# Patient Record
Sex: MALE | Race: WHITE | Employment: OTHER | URBAN - METROPOLITAN AREA
[De-identification: names, ages, dates, MRNs, and addresses within clinical notes are randomized per-mention and may not be internally consistent; named-entity substitution may affect disease eponyms.]

---

## 2017-03-02 RX ORDER — RIVAROXABAN 20 MG/1
TABLET, FILM COATED ORAL
Qty: 90 TABLET | Refills: 3 | Status: SHIPPED | OUTPATIENT
Start: 2017-03-02 | End: 2018-01-05 | Stop reason: SDUPTHER

## 2017-06-27 ENCOUNTER — OFFICE VISIT (OUTPATIENT)
Dept: FAMILY MEDICINE CLINIC | Age: 72
End: 2017-06-27
Payer: MEDICARE

## 2017-06-27 VITALS
DIASTOLIC BLOOD PRESSURE: 68 MMHG | SYSTOLIC BLOOD PRESSURE: 120 MMHG | HEIGHT: 64 IN | OXYGEN SATURATION: 98 % | HEART RATE: 75 BPM | BODY MASS INDEX: 32.2 KG/M2 | WEIGHT: 188.6 LBS

## 2017-06-27 DIAGNOSIS — J32.9 SINUSITIS, UNSPECIFIED CHRONICITY, UNSPECIFIED LOCATION: Primary | ICD-10-CM

## 2017-06-27 PROCEDURE — 99213 OFFICE O/P EST LOW 20 MIN: CPT | Performed by: FAMILY MEDICINE

## 2017-06-27 RX ORDER — AMOXICILLIN 875 MG/1
875 TABLET, COATED ORAL 2 TIMES DAILY
Qty: 20 TABLET | Refills: 0 | Status: SHIPPED | OUTPATIENT
Start: 2017-06-27 | End: 2017-07-07

## 2017-10-10 ENCOUNTER — NURSE ONLY (OUTPATIENT)
Dept: FAMILY MEDICINE CLINIC | Age: 72
End: 2017-10-10
Payer: MEDICARE

## 2017-10-10 DIAGNOSIS — Z23 IMMUNIZATION DUE: Primary | ICD-10-CM

## 2017-10-10 PROCEDURE — G0008 ADMIN INFLUENZA VIRUS VAC: HCPCS | Performed by: FAMILY MEDICINE

## 2017-10-10 PROCEDURE — 90688 IIV4 VACCINE SPLT 0.5 ML IM: CPT | Performed by: FAMILY MEDICINE

## 2017-11-21 ENCOUNTER — OFFICE VISIT (OUTPATIENT)
Dept: FAMILY MEDICINE CLINIC | Age: 72
End: 2017-11-21
Payer: MEDICARE

## 2017-11-21 VITALS
BODY MASS INDEX: 32.17 KG/M2 | SYSTOLIC BLOOD PRESSURE: 126 MMHG | WEIGHT: 188.4 LBS | HEIGHT: 64 IN | DIASTOLIC BLOOD PRESSURE: 78 MMHG

## 2017-11-21 DIAGNOSIS — T14.8XXA SKIN ABRASION: ICD-10-CM

## 2017-11-21 DIAGNOSIS — Z12.11 SCREENING FOR COLON CANCER: Primary | ICD-10-CM

## 2017-11-21 PROCEDURE — 1123F ACP DISCUSS/DSCN MKR DOCD: CPT | Performed by: NURSE PRACTITIONER

## 2017-11-21 PROCEDURE — 4040F PNEUMOC VAC/ADMIN/RCVD: CPT | Performed by: NURSE PRACTITIONER

## 2017-11-21 PROCEDURE — G8417 CALC BMI ABV UP PARAM F/U: HCPCS | Performed by: NURSE PRACTITIONER

## 2017-11-21 PROCEDURE — G8484 FLU IMMUNIZE NO ADMIN: HCPCS | Performed by: NURSE PRACTITIONER

## 2017-11-21 PROCEDURE — 3017F COLORECTAL CA SCREEN DOC REV: CPT | Performed by: NURSE PRACTITIONER

## 2017-11-21 PROCEDURE — 1036F TOBACCO NON-USER: CPT | Performed by: NURSE PRACTITIONER

## 2017-11-21 PROCEDURE — G8427 DOCREV CUR MEDS BY ELIG CLIN: HCPCS | Performed by: NURSE PRACTITIONER

## 2017-11-21 PROCEDURE — 99213 OFFICE O/P EST LOW 20 MIN: CPT | Performed by: NURSE PRACTITIONER

## 2017-11-21 RX ORDER — B-COMPLEX WITH VITAMIN C
TABLET ORAL DAILY
COMMUNITY
End: 2018-07-10 | Stop reason: ALTCHOICE

## 2017-11-21 RX ORDER — ACETAMINOPHEN 500 MG
1000 TABLET ORAL DAILY
Status: ON HOLD | COMMUNITY
End: 2018-07-24 | Stop reason: HOSPADM

## 2017-11-21 ASSESSMENT — ENCOUNTER SYMPTOMS
CONSTIPATION: 0
VOMITING: 0
EYE ITCHING: 0
STRIDOR: 0
EYE PAIN: 0
APNEA: 0
GASTROINTESTINAL NEGATIVE: 1
ABDOMINAL PAIN: 0
CHOKING: 0
EYE DISCHARGE: 0
COLOR CHANGE: 0
SHORTNESS OF BREATH: 0
PHOTOPHOBIA: 0
RESPIRATORY NEGATIVE: 1
ALLERGIC/IMMUNOLOGIC NEGATIVE: 1
NAUSEA: 0
WHEEZING: 0
VOICE CHANGE: 0
CHEST TIGHTNESS: 0
COUGH: 0
SORE THROAT: 0
EYES NEGATIVE: 1
BACK PAIN: 0
EYE REDNESS: 0
DIARRHEA: 0

## 2017-11-21 NOTE — PROGRESS NOTES
Sig Dispense Refill    XARELTO 20 MG TABS tablet TAKE 1 TABLET EVERY DAY 90 tablet 3     No current facility-administered medications for this visit. No Known Allergies    HPI:     HPI     Patient got out of the shower yesterday, patient isn't sure what happened but back was bleeding. Patient notes no injury or scratch to the back. Bleeding started when patient was using towel on his back. Bled for a short period of time and then stopped. Patient concerned because a friend passed away from skin cancer, and had a lesion that started bleeding as well. Patient is also on Xarelto for a history of Pe's. States no easy bruising or bleeding. Did not lose any significant amount of blood from this incident. Subjective:      Review of Systems   Constitutional: Negative. Negative for activity change, appetite change, chills, diaphoresis, fatigue, fever and unexpected weight change. HENT: Negative. Negative for congestion, postnasal drip, sneezing, sore throat and voice change. Eyes: Negative. Negative for photophobia, pain, discharge, redness, itching and visual disturbance. Respiratory: Negative. Negative for apnea, cough, choking, chest tightness, shortness of breath, wheezing and stridor. Cardiovascular: Negative. Negative for chest pain, palpitations and leg swelling. Gastrointestinal: Negative. Negative for abdominal pain, constipation, diarrhea, nausea and vomiting. Endocrine: Negative. Negative for cold intolerance, heat intolerance, polydipsia, polyphagia and polyuria. Genitourinary: Negative. Negative for difficulty urinating and dysuria. Musculoskeletal: Negative. Negative for arthralgias, back pain, gait problem, joint swelling, myalgias, neck pain and neck stiffness. Skin: Negative. Negative for color change, pallor, rash and wound. Abrasion w/ small scab to patients left-mid back   Allergic/Immunologic: Negative.   Negative for environmental allergies, food allergies and immunocompromised state. Neurological: Negative. Negative for dizziness, tremors, seizures, syncope, facial asymmetry, speech difficulty, weakness, light-headedness, numbness and headaches. Hematological: Negative. Negative for adenopathy. Does not bruise/bleed easily (on xarelto). Psychiatric/Behavioral: Negative. Objective:     Vitals:    11/21/17 0853   BP: 126/78       Body mass index is 32.34 kg/m². Physical Exam   Constitutional: He is oriented to person, place, and time. He appears well-developed and well-nourished. No distress. HENT:   Head: Normocephalic and atraumatic. Right Ear: External ear normal.   Left Ear: External ear normal.   Eyes: Conjunctivae are normal. Pupils are equal, round, and reactive to light. Neck: Normal range of motion. Neck supple. No JVD present. Cardiovascular: Normal rate, regular rhythm, normal heart sounds and intact distal pulses. Exam reveals no gallop and no friction rub. No murmur heard. Pulmonary/Chest: Effort normal and breath sounds normal. No respiratory distress. He has no wheezes. He has no rales. Abdominal: Soft. Bowel sounds are normal. He exhibits no distension. Musculoskeletal: Normal range of motion. He exhibits no edema, tenderness or deformity. Neurological: He is alert and oriented to person, place, and time. GCS eye subscore is 4. GCS verbal subscore is 5. GCS motor subscore is 6. Skin: Skin is warm and dry. Abrasion noted. No rash noted. No erythema. No pallor. Psychiatric: He has a normal mood and affect. His behavior is normal. Judgment and thought content normal.         Assessment:         1. Screening for colon cancer    2. Skin abrasion        Plan:     1. Screening for colon cancer    - COLONOSCOPY (Screening)  - [address] [facility] [address], DO*    5 year screening - given order for same physician patient saw five years ago.      2. Skin abrasion    Monitor abrasion/scab to ensure that it heals completely. At this time I do not seen anything concerning. Show area to Dr. Radha Geller on follow-up visit to make sure there are no concerns after healing. Discussed monitoring of skin including watching size, color, and borders of moles and bring any concerns you have to the office. Keep skin well moisturized. The ABCDEs of Melanoma  These characteristics are used by dermatologists to classify melanomas. Look for these signs: Asymmetry, irregular Borders, more than one or uneven distribution of Color, or a large (greater than 6mm) Diameter. Finally, pay attention to the Evolution of your moles - know what's normal for your skin and check it regularly for changes. If you see one or more of these, make an appointment with a dermatologist immediately. Need more information? Be sure also to check out our pictures of melanoma. Please note that not all melanomas fall within the ABCDE parameters so visit your dermatologist regularly to catch any potiential issues early. A - Asymmetrical Shape  Melanoma lesions are often irregular, or not symmetrical, in shape. Benign moles are usually symmetrical.    B - Border  Typically, non-cancerous moles have smooth, even borders. Melanoma lesions usually have irregular borders that are difficult to define. C - Color  The presence of more than one color (blue, black, brown, tan, etc.) or the uneven distribution of color can sometimes be a warning sign of melanoma. Benign moles are usually a single shade of brown or tan. D - Diameter  Melanoma lesions are often greater than 6 millimeters in diameter (approximately the size of a pencil eraser). E - Evolution  The evolution of your mole(s) has become the most important factor to consider when it comes to diagnosing a melanoma. Knowing what is normal for YOU could save your life.  If a mole has gone through recent changes in color and/or size, bring it to the

## 2017-12-13 ENCOUNTER — OFFICE VISIT (OUTPATIENT)
Dept: FAMILY MEDICINE CLINIC | Age: 72
End: 2017-12-13
Payer: MEDICARE

## 2017-12-13 VITALS
SYSTOLIC BLOOD PRESSURE: 110 MMHG | BODY MASS INDEX: 32.61 KG/M2 | HEIGHT: 64 IN | HEART RATE: 70 BPM | DIASTOLIC BLOOD PRESSURE: 72 MMHG | WEIGHT: 191 LBS

## 2017-12-13 DIAGNOSIS — Z00.00 ROUTINE GENERAL MEDICAL EXAMINATION AT A HEALTH CARE FACILITY: ICD-10-CM

## 2017-12-13 DIAGNOSIS — D12.6 ADENOMATOUS POLYP OF COLON, UNSPECIFIED PART OF COLON: Primary | ICD-10-CM

## 2017-12-13 PROCEDURE — G0439 PPPS, SUBSEQ VISIT: HCPCS | Performed by: FAMILY MEDICINE

## 2017-12-13 ASSESSMENT — PATIENT HEALTH QUESTIONNAIRE - PHQ9: SUM OF ALL RESPONSES TO PHQ QUESTIONS 1-9: 0

## 2017-12-13 NOTE — PATIENT INSTRUCTIONS
Types of advance directives     A health care proxy (durable power of ) is a document that names someone you trust to make health decisions if you cant. A living will tells which treatment you want if your life is threatened, including:    Dialysis and breathing machines  Resuscitation if you stop breathing or if your heart stops  Tube feeding  Organ or tissue donation after you die      The following web sites has both documents included along with a detailed explanation. For the state of [place]    http://[facility]. org/[facility]/media/Images/Membership%20Services/Documents/advance-directives-2015-update-final5. pdf    For the [facility]    http://www.tim.org/. pdf        Personalized Preventive Plan for Ken Mancilla - 12/13/2017  Medicare offers a range of preventive health benefits. Some of the tests and screenings are paid in full while other may be subject to a deductible, co-insurance, and/or copay. Some of these benefits include a comprehensive review of your medical history including lifestyle, illnesses that may run in your family, and various assessments and screenings as appropriate. After reviewing your medical record and screening and assessments performed today your provider may have ordered immunizations, labs, imaging, and/or referrals for you. A list of these orders (if applicable) as well as your Preventive Care list are included within your After Visit Summary for your review. Other Preventive Recommendations:    · A preventive eye exam performed by an eye specialist is recommended every 1-2 years to screen for glaucoma; cataracts, macular degeneration, and other eye disorders. · A preventive dental visit is recommended every 6 months. · Try to get at least 150 minutes of exercise per week or 10,000 steps per day on a pedometer .   · Order or download the FREE \"Exercise & Physical Activity: Your Everyday Guide\"

## 2017-12-13 NOTE — PROGRESS NOTES
Medicare Annual Wellness Visit  Name: [facility] Date: 2017   MRN: [id number] Sex: Male   Age: 81 y.o. Ethnicity: Non-/Non    : 1945 Race: Allison Sibley is here for Medicare AWV    Screenings for behavioral, psychosocial and functional/safety risks, and cognitive dysfunction are all negative except as indicated below. These results, as well as other patient data from the [address] form, are documented in Flowsheets linked to this Encounter. No Known Allergies  Prior to Visit Medications    Medication Sig Taking? Authorizing Provider   acetaminophen (TYLENOL) 500 MG tablet Take 1,000 mg by mouth daily Yes Historical Provider, MD   Zinc 100 MG TABS Take by mouth daily Yes Historical Provider, MD   XARELTO 20 MG TABS tablet TAKE 1 TABLET EVERY DAY Yes Marj Ferrer MD     History reviewed. No pertinent past medical history. History reviewed. No pertinent surgical history. History reviewed. No pertinent family history. CareTeam (Including outside providers/suppliers regularly involved in providing care):   Patient Care Team:  Marj Ferrer MD as PCP - General (Family Medicine)  Bella Keyes MD as Consulting Physician (Urology)  Leni Golden MD as Consulting Physician  Torsten Dominguez MD as Consulting Physician (Pulmonology)    Wt Readings from Last 3 Encounters:   17 191 lb (86.6 kg)   17 188 lb 6.4 oz (85.5 kg)   17 188 lb 9.6 oz (85.5 kg)     Vitals:    17 0854   BP: 110/72   Site: Right Arm   Position: Sitting   Cuff Size: Medium Adult   Pulse: 70   Weight: 191 lb (86.6 kg)   Height: 5' 4\" (1.626 m)       Subjective: Duane Hilliard presents for   Chief Complaint   Patient presents with   [facility]         Patient Active Problem List   Diagnosis    Pulmonary embolism without acute cor pulmonale ([facility] [address]    History of shingles       Review of Systems:  · General: no significant weight changes.     · Respiratory: no Fu colonoscopy     Order Specific Question:   Screening or Diagnostic? Answer:   Screening     Refer to surgery      Patient's complete Health Risk Assessment and screening values have been reviewed and are found in Flowsheets. The following problems were reviewed today and where indicated follow up appointments were made and/or referrals ordered. Positive Risk Factor Screenings with Interventions:     General Health:     General Health Risk Interventions:  · None indicated    Health Habits/Nutrition:     Body mass index is 32.79 kg/m². Health Habits/Nutrition Interventions:  · None indicated      Personalized Preventive Plan   Current Health Maintenance Status  Immunization History   Administered Date(s) Administered    Influenza Virus Vaccine 11/04/2013, 10/17/2014, 11/12/2015    Influenza, Quadv, 3 Years and older, IM 11/01/2016, 10/10/2017    Pneumococcal 13-valent Conjugate ([id number]) 12/12/2016    Pneumococcal Polysaccharide ([id number]) 11/04/2013        Health Maintenance   Topic Date Due    DTaP/Tdap/Td vaccine (1 - Tdap) 09/06/1966    Colon cancer screen colonoscopy  08/06/2017    Zostavax vaccine  01/01/2023 (Originally 9/6/2007)    Lipid screen  12/20/2021    Flu vaccine  Completed    Pneumococcal low/med risk  Completed    Hepatitis C screen  Completed     Recommendations for Preventive Services Due: see orders.   Recommended screening schedule for the next 5-10 years is provided to the patient in written form: see Patient Instructions/AVS.

## 2018-01-16 ENCOUNTER — HOSPITAL ENCOUNTER (OUTPATIENT)
Age: 73
Setting detail: SPECIMEN
Discharge: HOME | End: 2018-01-16
Payer: MEDICARE

## 2018-01-19 LAB — SURGICAL PATHOLOGY REPORT: NORMAL

## 2018-05-31 ENCOUNTER — OFFICE VISIT (OUTPATIENT)
Dept: FAMILY MEDICINE CLINIC | Age: 73
End: 2018-05-31
Payer: MEDICARE

## 2018-05-31 VITALS
DIASTOLIC BLOOD PRESSURE: 80 MMHG | WEIGHT: 187.5 LBS | OXYGEN SATURATION: 96 % | SYSTOLIC BLOOD PRESSURE: 114 MMHG | BODY MASS INDEX: 32.18 KG/M2 | HEART RATE: 72 BPM

## 2018-05-31 DIAGNOSIS — L03.011 CELLULITIS OF FINGER OF RIGHT HAND: Primary | ICD-10-CM

## 2018-05-31 DIAGNOSIS — K40.91 UNILATERAL RECURRENT INGUINAL HERNIA WITHOUT OBSTRUCTION OR GANGRENE: ICD-10-CM

## 2018-05-31 PROCEDURE — 4040F PNEUMOC VAC/ADMIN/RCVD: CPT | Performed by: FAMILY MEDICINE

## 2018-05-31 PROCEDURE — G8427 DOCREV CUR MEDS BY ELIG CLIN: HCPCS | Performed by: FAMILY MEDICINE

## 2018-05-31 PROCEDURE — 99214 OFFICE O/P EST MOD 30 MIN: CPT | Performed by: FAMILY MEDICINE

## 2018-05-31 PROCEDURE — 1123F ACP DISCUSS/DSCN MKR DOCD: CPT | Performed by: FAMILY MEDICINE

## 2018-05-31 PROCEDURE — 1036F TOBACCO NON-USER: CPT | Performed by: FAMILY MEDICINE

## 2018-05-31 PROCEDURE — G8417 CALC BMI ABV UP PARAM F/U: HCPCS | Performed by: FAMILY MEDICINE

## 2018-05-31 PROCEDURE — 3017F COLORECTAL CA SCREEN DOC REV: CPT | Performed by: FAMILY MEDICINE

## 2018-05-31 RX ORDER — CEPHALEXIN 500 MG/1
500 CAPSULE ORAL 3 TIMES DAILY
Qty: 30 CAPSULE | Refills: 0 | Status: SHIPPED | OUTPATIENT
Start: 2018-05-31 | End: 2018-06-10

## 2018-07-10 ENCOUNTER — HOSPITAL ENCOUNTER (OUTPATIENT)
Dept: PREADMISSION TESTING | Age: 73
Discharge: HOME | End: 2018-07-14
Payer: MEDICARE

## 2018-07-10 LAB
ABSOLUTE EOS #: 0.4 K/UL (ref 0–0.4)
ABSOLUTE IMMATURE GRANULOCYTE: ABNORMAL K/UL (ref 0–0.3)
ABSOLUTE LYMPH #: 2.6 K/UL (ref 1–4.8)
ABSOLUTE MONO #: 0.9 K/UL (ref 0.2–0.8)
BASOPHILS # BLD: 1 % (ref 0–2)
BASOPHILS ABSOLUTE: 0.1 K/UL (ref 0–0.2)
DIFFERENTIAL TYPE: ABNORMAL
EKG ATRIAL RATE: 61 BPM
EKG P AXIS: 14 DEGREES
EKG P-R INTERVAL: 174 MS
EKG Q-T INTERVAL: 400 MS
EKG QRS DURATION: 100 MS
EKG QTC CALCULATION (BAZETT): 402 MS
EKG R AXIS: -25 DEGREES
EKG T AXIS: 15 DEGREES
EKG VENTRICULAR RATE: 61 BPM
EOSINOPHILS RELATIVE PERCENT: 5 % (ref 1–4)
HCT VFR BLD CALC: 50 % (ref 41–53)
HEMOGLOBIN: 15.9 G/DL (ref 13.5–17.5)
IMMATURE GRANULOCYTES: ABNORMAL %
LYMPHOCYTES # BLD: 31 % (ref 24–44)
MCH RBC QN AUTO: 28.1 PG (ref 26–34)
MCHC RBC AUTO-ENTMCNC: 31.7 G/DL (ref 31–37)
MCV RBC AUTO: 88.6 FL (ref 80–100)
MONOCYTES # BLD: 11 % (ref 1–7)
NRBC AUTOMATED: ABNORMAL PER 100 WBC
PDW BLD-RTO: 14.6 % (ref 11.5–14.5)
PLATELET # BLD: 216 K/UL (ref 130–400)
PLATELET ESTIMATE: ABNORMAL
PMV BLD AUTO: 9.8 FL (ref 6–12)
RBC # BLD: 5.65 M/UL (ref 4.5–5.9)
RBC # BLD: ABNORMAL 10*6/UL
SEG NEUTROPHILS: 52 % (ref 36–66)
SEGMENTED NEUTROPHILS ABSOLUTE COUNT: 4.3 K/UL (ref 1.8–7.7)
WBC # BLD: 8.3 K/UL (ref 3.5–11)
WBC # BLD: ABNORMAL 10*3/UL

## 2018-07-10 PROCEDURE — 36415 COLL VENOUS BLD VENIPUNCTURE: CPT

## 2018-07-10 PROCEDURE — 93005 ELECTROCARDIOGRAM TRACING: CPT

## 2018-07-10 PROCEDURE — 85025 COMPLETE CBC W/AUTO DIFF WBC: CPT

## 2018-07-10 NOTE — PRE-PROCEDURE INSTRUCTIONS
[facility] Tuesday[facility] at 6:00 AM    Once you enter the hospital lobby, take the elevators to the second floor. Check-In is at the surgery registration desk. If you have been given a blood band, you must bring it with you the day of surgery. Continue to take your home medications as you normally do up to and including the night before surgery with the exception of any blood thinning medications. Please stop any blood thinning medications as directed by your surgeon or prescribing physician. Failure to stop certain medications may interfere with your scheduled surgery. These may include:  Aspirin, Warfarin (Coumadin), Clopidogrel (Plavix), Ibuprofen (Motrin, Advil), Naproxen (Aleve), Meloxicam (Mobic), Celecoxib (Celebrex), Eliquis, Pradaxa, Xarelto, Effient, Fish Oil, Herbal supplements. If you are diabetic, do not take any of your diabetic medications by mouth the morning of surgery. If you are taking insulin contact the doctor that manages your diabetes for instructions about any changes to your insulin dosages the day before surgery. Do not inject insulin or other injectable diabetic medications the morning of surgery unless otherwise instructed by the doctor who manages your diabetes. Please take the following medication(s) the day of surgery with a small sip of water:  None. Please use your inhalers at home the day of surgery. PREPARING FOR YOUR SURGERY:     Before surgery, you can play an important role in your own health. Because skin is not sterile, we need to be sure that your skin is as free of germs as possible before surgery by carefully washing before surgery. Preparing or prepping skin before surgery can reduce the risk of a surgical site infection.   Do not shave the area of your body where your surgery will be performed unless you received specific permission from your physician.     You will need to shower at home the night before surgery and the morning of surgery with a special soap called chlorhexidine gluconate (CHG*). *Not to be used by people allergic to Chlorhexidine Gluconate (CHG). Following these instructions will help you be sure that your skin is clean before surgery. Instructions on cleaning your skin before surgery: The night before your surgery:      You will need to shower with warm water (not hot) and the CHG soap.  Use a clean wash cloth and a clean towel. Have clean clothes available to put on after the shower.    First wash your hair with regular shampoo. Rinse your hair and body thoroughly to remove the shampoo. Cielo Martines Wash your face with your regular soap or water only. Thoroughly rinse your body with warm water from the neck down.  Turn water off to prevent rinsing the soap off too soon.  With a clean wet washcloth and half of the CHG soap in the bottle, lather your entire body from the neck down. Do not use CHG soap near your eyes or ears to avoid injury to those areas.  Wash thoroughly, paying special attention to the area where your surgery will be performed.  Wash your body gently for five (5) minutes. Avoid scrubbing your skin too hard.  Turn the water back on and rinse your body thoroughly.  Pat yourself dry with a clean, soft towel. Do not apply lotion, cream or powder.  Dress with clean freshly washed clothes. The morning of surgery:     Repeat shower following steps above - using remaining half of CHG soap in bottle. Patient Instructions:    Cielo Martines If you are having any type of anesthesia you are to have nothing to eat or drink after midnight the night before your surgery. This includes gum, mints, water or smoking or chewing tobacco.  The only exception to this is a small sip of water to take with any morning dose of heart, blood pressure, or seizure medications. No alcoholic beverages for 24 hours prior to surgery.      Bring a list of all medications you take, along with the dose of the medications and how often you take it. If more convenient bring the pharmacy bottles in a zip lock bag.  Brush your teeth but do not swallow water.  Bring your eyeglasses and case with you. No contacts are to be worn the day of surgery. You also may bring your hearing aids.  If you are on C-PAP or Bi-PAP at home and plan on staying in the hospital overnight for your surgery please bring the machine with you. · Do not wear any jewelry or body piercings day of surgery. Also, NO lotion, perfume or deodorant to be used the day of surgery. No nail polish on the operative extremity (arm/leg surgeries)    · Do not bring any valuables such as jewelry, cash, or credit cards. If you are staying overnight with us, please bring a small bag of personal items.  Please wear loose, comfortable clothing. If you are potentially going to have a cast or brace bring clothing that will fit over them.  In case of illness - If you have cold or flu like symptoms (high fever, runny nose, sore throat, cough, etc.) rash, nausea, vomiting, loose stools, and/or recent contact with someone who has a contagious disease (chicken pox, measles, etc.) Please call your doctor before coming to the hospital.     If your child is having surgery please make arrangements for any other children to be cared for at home on the day of surgery. Other children are not permitted in recovery room and we want you to be able to spend time with the patient. If other arrangements are not available then we suggest that you have a second adult to stay in the waiting room. Day of Surgery/Procedure:    As a patient at [place] [address] you can expect quality medical and nursing care that is centered on your individual needs.   Our goal is to make your surgical experience as comfortable as

## 2018-07-23 ENCOUNTER — ANESTHESIA EVENT (OUTPATIENT)
Dept: OPERATING ROOM | Age: 73
End: 2018-07-23
Payer: MEDICARE

## 2018-07-24 ENCOUNTER — HOSPITAL ENCOUNTER (OUTPATIENT)
Age: 73
Setting detail: OUTPATIENT SURGERY
Discharge: HOME | End: 2018-07-24
Attending: SURGERY | Admitting: SURGERY
Payer: MEDICARE

## 2018-07-24 ENCOUNTER — ANESTHESIA (OUTPATIENT)
Dept: OPERATING ROOM | Age: 73
End: 2018-07-24
Payer: MEDICARE

## 2018-07-24 VITALS — OXYGEN SATURATION: 94 % | TEMPERATURE: 96.4 F | SYSTOLIC BLOOD PRESSURE: 110 MMHG | DIASTOLIC BLOOD PRESSURE: 61 MMHG

## 2018-07-24 VITALS
RESPIRATION RATE: 19 BRPM | BODY MASS INDEX: 30.73 KG/M2 | HEART RATE: 72 BPM | TEMPERATURE: 97.7 F | DIASTOLIC BLOOD PRESSURE: 75 MMHG | WEIGHT: 180 LBS | HEIGHT: 64 IN | OXYGEN SATURATION: 85 % | SYSTOLIC BLOOD PRESSURE: 132 MMHG

## 2018-07-24 PROBLEM — Z85.46 HISTORY OF PROSTATE CANCER: Chronic | Status: ACTIVE | Noted: 2018-07-24

## 2018-07-24 PROBLEM — K40.91 RECURRENT RIGHT INGUINAL HERNIA: Status: ACTIVE | Noted: 2018-07-24

## 2018-07-24 PROBLEM — Z86.711 HISTORY OF PULMONARY EMBOLISM: Chronic | Status: ACTIVE | Noted: 2018-07-24

## 2018-07-24 PROBLEM — K40.91 RECURRENT RIGHT INGUINAL HERNIA: Status: RESOLVED | Noted: 2018-07-24 | Resolved: 2018-07-24

## 2018-07-24 PROCEDURE — 2500000003 HC RX 250 WO HCPCS: Performed by: SURGERY

## 2018-07-24 PROCEDURE — 7100000000 HC PACU RECOVERY - FIRST 15 MIN: Performed by: SURGERY

## 2018-07-24 PROCEDURE — 7100000001 HC PACU RECOVERY - ADDTL 15 MIN: Performed by: SURGERY

## 2018-07-24 PROCEDURE — 3700000000 HC ANESTHESIA ATTENDED CARE: Performed by: SURGERY

## 2018-07-24 PROCEDURE — 3600000012 HC SURGERY LEVEL 2 ADDTL 15MIN: Performed by: SURGERY

## 2018-07-24 PROCEDURE — 3600000002 HC SURGERY LEVEL 2 BASE: Performed by: SURGERY

## 2018-07-24 PROCEDURE — 2709999900 HC NON-CHARGEABLE SUPPLY: Performed by: SURGERY

## 2018-07-24 PROCEDURE — 2500000003 HC RX 250 WO HCPCS

## 2018-07-24 PROCEDURE — 6360000002 HC RX W HCPCS: Performed by: NURSE ANESTHETIST, CERTIFIED REGISTERED

## 2018-07-24 PROCEDURE — 7100000010 HC PHASE II RECOVERY - FIRST 15 MIN: Performed by: SURGERY

## 2018-07-24 PROCEDURE — 2580000003 HC RX 258: Performed by: ANESTHESIOLOGY

## 2018-07-24 PROCEDURE — C1781 MESH (IMPLANTABLE): HCPCS | Performed by: SURGERY

## 2018-07-24 PROCEDURE — 2500000003 HC RX 250 WO HCPCS: Performed by: NURSE ANESTHETIST, CERTIFIED REGISTERED

## 2018-07-24 PROCEDURE — 7100000011 HC PHASE II RECOVERY - ADDTL 15 MIN: Performed by: SURGERY

## 2018-07-24 PROCEDURE — 6360000002 HC RX W HCPCS: Performed by: SURGERY

## 2018-07-24 PROCEDURE — 2580000003 HC RX 258: Performed by: NURSE ANESTHETIST, CERTIFIED REGISTERED

## 2018-07-24 PROCEDURE — 3700000001 HC ADD 15 MINUTES (ANESTHESIA): Performed by: SURGERY

## 2018-07-24 DEVICE — IMPLANTABLE DEVICE: Type: IMPLANTABLE DEVICE | Site: GROIN | Status: FUNCTIONAL

## 2018-07-24 RX ORDER — FENTANYL CITRATE 50 UG/ML
INJECTION, SOLUTION INTRAMUSCULAR; INTRAVENOUS PRN
Status: DISCONTINUED | OUTPATIENT
Start: 2018-07-24 | End: 2018-07-24 | Stop reason: SDUPTHER

## 2018-07-24 RX ORDER — LIDOCAINE HYDROCHLORIDE 10 MG/ML
1 INJECTION, SOLUTION EPIDURAL; INFILTRATION; INTRACAUDAL; PERINEURAL
Status: DISCONTINUED | OUTPATIENT
Start: 2018-07-24 | End: 2018-07-24 | Stop reason: HOSPADM

## 2018-07-24 RX ORDER — OXYCODONE HYDROCHLORIDE AND ACETAMINOPHEN 5; 325 MG/1; MG/1
1 TABLET ORAL
Status: DISCONTINUED | OUTPATIENT
Start: 2018-07-24 | End: 2018-07-24 | Stop reason: HOSPADM

## 2018-07-24 RX ORDER — SODIUM CHLORIDE 0.9 % (FLUSH) 0.9 %
10 SYRINGE (ML) INJECTION EVERY 12 HOURS SCHEDULED
Status: DISCONTINUED | OUTPATIENT
Start: 2018-07-24 | End: 2018-07-24 | Stop reason: HOSPADM

## 2018-07-24 RX ORDER — EPHEDRINE SULFATE/0.9% NACL/PF 10MG/ML(1)
SYRINGE (ML) INTRAVENOUS PRN
Status: DISCONTINUED | OUTPATIENT
Start: 2018-07-24 | End: 2018-07-24 | Stop reason: SDUPTHER

## 2018-07-24 RX ORDER — GLYCOPYRROLATE 1 MG/5 ML
SYRINGE (ML) INTRAVENOUS PRN
Status: DISCONTINUED | OUTPATIENT
Start: 2018-07-24 | End: 2018-07-24 | Stop reason: SDUPTHER

## 2018-07-24 RX ORDER — SODIUM CHLORIDE 0.9 % (FLUSH) 0.9 %
10 SYRINGE (ML) INJECTION PRN
Status: DISCONTINUED | OUTPATIENT
Start: 2018-07-24 | End: 2018-07-24 | Stop reason: HOSPADM

## 2018-07-24 RX ORDER — HYDROCODONE BITARTRATE AND ACETAMINOPHEN 5; 325 MG/1; MG/1
1 TABLET ORAL ONCE
Status: DISCONTINUED | OUTPATIENT
Start: 2018-07-24 | End: 2018-07-24 | Stop reason: HOSPADM

## 2018-07-24 RX ORDER — ROCURONIUM BROMIDE 10 MG/ML
INJECTION, SOLUTION INTRAVENOUS PRN
Status: DISCONTINUED | OUTPATIENT
Start: 2018-07-24 | End: 2018-07-24 | Stop reason: SDUPTHER

## 2018-07-24 RX ORDER — DEXAMETHASONE SODIUM PHOSPHATE 10 MG/ML
INJECTION INTRAMUSCULAR; INTRAVENOUS PRN
Status: DISCONTINUED | OUTPATIENT
Start: 2018-07-24 | End: 2018-07-24 | Stop reason: SDUPTHER

## 2018-07-24 RX ORDER — SODIUM CHLORIDE, SODIUM LACTATE, POTASSIUM CHLORIDE, CALCIUM CHLORIDE 600; 310; 30; 20 MG/100ML; MG/100ML; MG/100ML; MG/100ML
INJECTION, SOLUTION INTRAVENOUS CONTINUOUS PRN
Status: DISCONTINUED | OUTPATIENT
Start: 2018-07-24 | End: 2018-07-24 | Stop reason: SDUPTHER

## 2018-07-24 RX ORDER — SODIUM CHLORIDE 9 MG/ML
INJECTION, SOLUTION INTRAVENOUS CONTINUOUS
Status: DISCONTINUED | OUTPATIENT
Start: 2018-07-25 | End: 2018-07-24

## 2018-07-24 RX ORDER — ONDANSETRON 2 MG/ML
INJECTION INTRAMUSCULAR; INTRAVENOUS PRN
Status: DISCONTINUED | OUTPATIENT
Start: 2018-07-24 | End: 2018-07-24 | Stop reason: SDUPTHER

## 2018-07-24 RX ORDER — ONDANSETRON 2 MG/ML
4 INJECTION INTRAMUSCULAR; INTRAVENOUS
Status: DISCONTINUED | OUTPATIENT
Start: 2018-07-24 | End: 2018-07-24 | Stop reason: HOSPADM

## 2018-07-24 RX ORDER — FENTANYL CITRATE 50 UG/ML
25 INJECTION, SOLUTION INTRAMUSCULAR; INTRAVENOUS EVERY 5 MIN PRN
Status: DISCONTINUED | OUTPATIENT
Start: 2018-07-24 | End: 2018-07-24 | Stop reason: HOSPADM

## 2018-07-24 RX ORDER — LIDOCAINE HYDROCHLORIDE 20 MG/ML
INJECTION, SOLUTION INFILTRATION; PERINEURAL PRN
Status: DISCONTINUED | OUTPATIENT
Start: 2018-07-24 | End: 2018-07-24 | Stop reason: SDUPTHER

## 2018-07-24 RX ORDER — BUPIVACAINE HYDROCHLORIDE AND EPINEPHRINE 5; 5 MG/ML; UG/ML
INJECTION, SOLUTION EPIDURAL; INTRACAUDAL; PERINEURAL PRN
Status: DISCONTINUED | OUTPATIENT
Start: 2018-07-24 | End: 2018-07-24 | Stop reason: HOSPADM

## 2018-07-24 RX ORDER — NEOSTIGMINE METHYLSULFATE 5 MG/5 ML
SYRINGE (ML) INTRAVENOUS PRN
Status: DISCONTINUED | OUTPATIENT
Start: 2018-07-24 | End: 2018-07-24 | Stop reason: SDUPTHER

## 2018-07-24 RX ORDER — FENTANYL CITRATE 50 UG/ML
50 INJECTION, SOLUTION INTRAMUSCULAR; INTRAVENOUS EVERY 5 MIN PRN
Status: DISCONTINUED | OUTPATIENT
Start: 2018-07-24 | End: 2018-07-24 | Stop reason: HOSPADM

## 2018-07-24 RX ORDER — PROPOFOL 10 MG/ML
INJECTION, EMULSION INTRAVENOUS PRN
Status: DISCONTINUED | OUTPATIENT
Start: 2018-07-24 | End: 2018-07-24 | Stop reason: SDUPTHER

## 2018-07-24 RX ORDER — SODIUM CHLORIDE, SODIUM LACTATE, POTASSIUM CHLORIDE, CALCIUM CHLORIDE 600; 310; 30; 20 MG/100ML; MG/100ML; MG/100ML; MG/100ML
INJECTION, SOLUTION INTRAVENOUS CONTINUOUS
Status: DISCONTINUED | OUTPATIENT
Start: 2018-07-25 | End: 2018-07-24 | Stop reason: HOSPADM

## 2018-07-24 RX ADMIN — LIDOCAINE HYDROCHLORIDE 70 MG: 20 INJECTION, SOLUTION INFILTRATION; PERINEURAL at 07:39

## 2018-07-24 RX ADMIN — SODIUM CHLORIDE, POTASSIUM CHLORIDE, SODIUM LACTATE AND CALCIUM CHLORIDE: 600; 310; 30; 20 INJECTION, SOLUTION INTRAVENOUS at 07:30

## 2018-07-24 RX ADMIN — ONDANSETRON 4 MG: 2 INJECTION, SOLUTION INTRAMUSCULAR; INTRAVENOUS at 08:28

## 2018-07-24 RX ADMIN — Medication 0.2 MG: at 07:47

## 2018-07-24 RX ADMIN — ROCURONIUM BROMIDE 40 MG: 10 INJECTION, SOLUTION INTRAVENOUS at 07:39

## 2018-07-24 RX ADMIN — Medication 0.6 MG: at 08:31

## 2018-07-24 RX ADMIN — PROPOFOL 130 MG: 10 INJECTION, EMULSION INTRAVENOUS at 07:39

## 2018-07-24 RX ADMIN — Medication 10 MG: at 08:21

## 2018-07-24 RX ADMIN — Medication 4 MG: at 08:31

## 2018-07-24 RX ADMIN — DEXAMETHASONE SODIUM PHOSPHATE 10 MG: 10 INJECTION INTRAMUSCULAR; INTRAVENOUS at 07:45

## 2018-07-24 RX ADMIN — PHENYLEPHRINE HYDROCHLORIDE 100 MCG: 10 INJECTION INTRAVENOUS at 08:00

## 2018-07-24 RX ADMIN — SODIUM CHLORIDE, POTASSIUM CHLORIDE, SODIUM LACTATE AND CALCIUM CHLORIDE: 600; 310; 30; 20 INJECTION, SOLUTION INTRAVENOUS at 06:40

## 2018-07-24 RX ADMIN — FENTANYL CITRATE 100 MCG: 50 INJECTION, SOLUTION INTRAMUSCULAR; INTRAVENOUS at 07:39

## 2018-07-24 RX ADMIN — CEFAZOLIN SODIUM 2 G: 2 SOLUTION INTRAVENOUS at 07:48

## 2018-07-24 RX ADMIN — PHENYLEPHRINE HYDROCHLORIDE 100 MCG: 10 INJECTION INTRAVENOUS at 07:50

## 2018-07-24 ASSESSMENT — PULMONARY FUNCTION TESTS
PIF_VALUE: 18
PIF_VALUE: 16
PIF_VALUE: 23
PIF_VALUE: 16
PIF_VALUE: 2
PIF_VALUE: 18
PIF_VALUE: 17
PIF_VALUE: 16
PIF_VALUE: 18
PIF_VALUE: 4
PIF_VALUE: 17
PIF_VALUE: 2
PIF_VALUE: 18
PIF_VALUE: 17
PIF_VALUE: 15
PIF_VALUE: 15
PIF_VALUE: 17
PIF_VALUE: 17
PIF_VALUE: 19
PIF_VALUE: 17
PIF_VALUE: 18
PIF_VALUE: 17
PIF_VALUE: 18
PIF_VALUE: 16
PIF_VALUE: 18
PIF_VALUE: 20
PIF_VALUE: 15
PIF_VALUE: 17
PIF_VALUE: 17
PIF_VALUE: 16
PIF_VALUE: 18
PIF_VALUE: 0
PIF_VALUE: 18
PIF_VALUE: 18
PIF_VALUE: 20
PIF_VALUE: 18
PIF_VALUE: 22
PIF_VALUE: 17
PIF_VALUE: 7
PIF_VALUE: 25
PIF_VALUE: 0
PIF_VALUE: 17
PIF_VALUE: 17
PIF_VALUE: 18
PIF_VALUE: 18
PIF_VALUE: 16
PIF_VALUE: 17
PIF_VALUE: 2
PIF_VALUE: 17
PIF_VALUE: 18
PIF_VALUE: 17
PIF_VALUE: 18
PIF_VALUE: 2
PIF_VALUE: 17
PIF_VALUE: 15
PIF_VALUE: 1
PIF_VALUE: 19
PIF_VALUE: 2
PIF_VALUE: 18
PIF_VALUE: 17
PIF_VALUE: 18
PIF_VALUE: 17
PIF_VALUE: 16
PIF_VALUE: 1
PIF_VALUE: 17
PIF_VALUE: 22
PIF_VALUE: 20
PIF_VALUE: 18

## 2018-07-24 ASSESSMENT — ENCOUNTER SYMPTOMS: SHORTNESS OF BREATH: 0

## 2018-07-24 ASSESSMENT — PAIN SCALES - GENERAL
PAINLEVEL_OUTOF10: 0

## 2018-07-24 NOTE — ANESTHESIA PRE PROCEDURE
Department of Anesthesiology  Preprocedure Note       Name:  Mamta Murrell   Age:  81 y.o.  :  1945                                          MRN:  [id number]         Date:  2018      Surgeon: Ivanna Espana):  Xavier Smith MD    Procedure: Procedure(s): HERNIA INGUINAL REPAIR RIGHT    Medications prior to admission:   Prior to Admission medications    Medication Sig Start Date End Date Taking?  Authorizing Provider   acetaminophen (TYLENOL) 500 MG tablet Take 1,000 mg by mouth daily   Yes Historical Provider, MD   rivaroxaban (XARELTO) 20 MG TABS tablet TAKE 1 TABLET EVERY DAY 18   Timur Capellan MD       Current medications:    Current Facility-Administered Medications   Medication Dose Route Frequency Provider Last Rate Last Dose    ceFAZolin (ANCEF) 2 g in dextrose 3 % 50 mL IVPB (duplex)  2 g Intravenous Once Xavier Smith MD       [address] [place] [address] [START ON 2018] lactated ringers infusion   Intravenous Continuous Ronda Cedeño  mL/hr at 18 0640      sodium chloride flush 0.9 % injection 10 mL  10 mL Intravenous 2 times per day Ronda Cedeño MD        sodium chloride flush 0.9 % injection 10 mL  10 mL Intravenous PRN Ronda Cedeño MD        lidocaine PF 1 % injection 1 mL  1 mL Intradermal Once PRN Ronda Cedeño MD        fentaNYL (SUBLIMAZE) injection 25 mcg  25 mcg Intravenous Q5 Min PRN Samy Cole MD        fentaNYL (SUBLIMAZE) injection 50 mcg  50 mcg Intravenous Q5 Min PRN Samy Cole MD        oxyCODONE-acetaminophen (PERCOCET) 5-325 MG per tablet 1 tablet  1 tablet Oral Once PRN Samy Cole MD        ondansetron [facility]) injection 4 mg  4 mg Intravenous Once PRN Samy Cole MD           Allergies:  No Known Allergies    Problem List:    Patient Active Problem List   Diagnosis Code    Pulmonary embolism without acute cor pulmonale (HCC) I26.99    History of shingles Z86.19       Past Medical History:        Diagnosis Date    Arthritis     Hand, bilateral and

## 2018-07-24 NOTE — H&P
History and Physical Service   [facility]    HISTORY AND PHYSICAL EXAMINATION            Date of Evaluation: 7/24/2018  Patient name:  Jay Beal  MRN:   [id number]  YOB: 1945  PCP:    Hernandez Monique MD    History Obtained From:     Patient    History of Present Illness: This is Jay Beal a 81 y.o. male who presents today for a Right inguinal hernia repair by Dr. Ceja for right inguinal hernia. The patient is active golfer and exercises with weights regularly. He noticed a bulging discomfort in the right groin that has progressively worsened the past year. S/p right inguinal hernia arepair as child and left inguinal hernia repair x2 last > 15 years ago. He c/o today that left is also bothering him again which began after his visit with Dr Keith. Advised to discuss this with him today. He denies constant groin pain, fever, chills, productive cough, SOB, or chest pain. Hx of DVT left lower leg Tx with anticoagulants. 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 Last dose 7/19/18  Denies swelling, warmth or redness in calf, SOB or wheezing. Past Medical History:     Past Medical History:   Diagnosis Date    Arthritis     Hand, bilateral and Posterior Neck    Cancer ([address] 2008    Prostate, radiation seeds implanted.  Cystic fibrosis ([facility] [address] 2006    Foot fracture, right     GERD (gastroesophageal reflux disease)     Tums takes it away per pt.  Hx of blood clots 2012    Bilateral Lung, Left Leg    Shoulder fracture, right     Wears glasses         Past Surgical History:     Past Surgical History:   Procedure Laterality Date    COLONOSCOPY      HERNIA REPAIR Right     Hernia at 10yrs old approx. and 1994 Hernia repair. Unknown type per pt.     LUNG SURGERY Left 2006    Due to cystic fibrosis        Medications Prior to Admission:     Prior to Admission medications    Medication Sig Start Date End Date Taking? Authorizing Provider   rivaroxaban (XARELTO) 20 MG TABS tablet TAKE 1 TABLET EVERY DAY 18   Roberto Lindsey MD   acetaminophen (TYLENOL) 500 MG tablet Take 1,000 mg by mouth daily    Historical Provider, MD        Allergies:     Patient has no known allergies. Social History:     Tobacco:    reports that he has never smoked. He has never used smokeless tobacco.  Alcohol:      has no alcohol history on file. Drug Use:  reports that he does not use drugs. Family History:     No family history on file. Review of Systems:     Positive and Negative as described in HPI. CONSTITUTIONAL:  negative for fevers, chills, sweats, fatigue, weight loss  HEENT:  negative for vision, hearing changes, runny nose, throat pain  RESPIRATORY:  negative for shortness of breath, cough, congestion, wheezing. CARDIOVASCULAR:  negative for chest pain, palpitations. GASTROINTESTINAL:  negative for nausea, vomiting, diarrhea, constipation, change in bowel habits, abdominal pain   GENITOURINARY: See HPI Also c/o left inguinal bulging for past month  negative for difficulty of urination, burning with urination, frequency   INTEGUMENT:  negative for rash, skin lesions, easy bruising   HEMATOLOGIC/LYMPHATIC: Hx of DVT and PE on long term anticoagulant therapy Xarelto   negative for swelling/edema   ALLERGIC/IMMUNOLOGIC:  negative for urticaria , itching  ENDOCRINE:  negative increase in drinking, increase in urination, hot or cold intolerance  MUSCULOSKELETAL:  negative joint pains, muscle aches, swelling of joints  NEUROLOGICAL:  negative for headaches, dizziness, lightheadedness, numbness, pain, tingling extremities  BEHAVIOR/PSYCH:  negative for depression, anxiety    Physical Exam:   /83   Pulse 70   Temp 97.5 °F (36.4 °C) (Oral)   Resp 20   SpO2 93%   No results for input(s): POCGLU in the last 72 hours.     General Appearance:  Healthy male, alert, well appearing, and in no acute

## 2018-07-24 NOTE — OP NOTE
Operative Note      NAME: Swathi Chirinos   MRN: [id number]  : 1945  PROCEDURE DATE: 2018    Surgeon: Beth Robison) and Role:     * Rafita Gudino MD - Primary    Assistants: MARTHA Toledo Staff: Circulator: Rola Mancilla RN  Surgical Assistant: Bryce Bagley  Scrub Person First: Kerney Bumpers    Pre-op Diagnosis: DX RECURRENT RIGHT INGUINAL HERNIA    Post-op Diagnosis: SAME, COMBINED DIRECT AND INDIRECT, [id number]     Procedure Details: Procedure(s):  RECURRENT HERNIA INGUINAL REPAIR RIGHT WITH MESH (Right) [facility] MESH REPAIR)    Anesthesia Type: General    Indications: This is a 74-year-old male who has had inguinal hernia repairs on both sides in the past, the left having been repaired twice in the right one. Repair on the right was done in late childhood at age 10. He has known that he has had a recurrent right inguinal hernia for a couple of years but just recently became symptomatic him a primarily interfering with his ability to play golf. He has an obvious inguinal hernia on exam which is reducible. Repair has been recommended to him and he agrees understanding indications as well as the risks. Findings: The patient primarily had a large direct defect with a small indirect component. The cord had been skeletonized previously except for preservation of the external spermatic vessels, which were divided as part of this procedure. The Prolene Hernia System extended size mesh was used for repair. Procedure details:  With the patient supine on the operating table the abdomen was prepped and draped in the usual sterile fashion. The patient received Ancef 2 g IV as IV antibiotic wound prophylaxis. EPC cuffs were placed just prior to the induction of anesthesia and utilized throughout for DVT prophylaxis.      The procedure is begun by making a skin incision through his old scar which was angled obliquely over the inguinal canal.  This was carried down through the subcutaneous tissue and Mina's fascia until the external oblique and external ring could be demonstrated by sharp dissection. Injections of 0.5% bupivacaine with epinephrine were made directly into the inguinal canal to aid in postoperative analgesia. The inguinal canal was opened sharply in the line of its fibers down through the external ring. By sharp dissection the cord was freed from the confines of the canal.  The ilioinguinal nerve was found densely adherent to the overlying external oblique by scar and it reached the point where it appeared very atrophic and may have been either devascularized or divided previously. This was then dissected lateral to this and divided again with a hemostat and tied with 3-0 Monocryl to avoid neuroma formation or entrapment. The cord showed only a few tiny vestiges of remaining cremasterics fascia was mostly skeletonized by the primary repair. This was dissected circumferentially and elevated with a Penrose drain. It could be seen at the external spermatic vessels of been preserved and these were initially preserved but later divided to accommodate the repair. A very large direct defect was evident at this point protruding through the midportion of Hesselbach's triangle and extending in an overlapping fashion over the pubic tubercle. This was dissected free from the cord. The mouth of the sac would admit 3 fingers easily and was estimated at 4-1/2-5 cm in size. The attenuated transversalis fascia was excised from the protruding portion of this direct hernia and discarded. The contents were dissected free and reduced into the preperitoneal space and careful blunt dissection of the entire myopectineal orifice was performed. The inferior epigastric vessels were easily visible. There was an indirect defect where some of the preperitoneal fat was coming anterior medial to the cord structures.   It was possible to reduce this during the dissection working through the floor the canal and it was felt layer of the mesh and tied over it to anchor this near the pubic tubercle and there was a good overlap of the mesh beyond the pubic tubercle. The wound and the mesh was irrigated with saline solution containing gentamicin. The cord structures were placed back into the canal and the external oblique was closed over the cord with running suture of 2-0 PDS. Mina's fascia was closed with interrupted sutures of 3-0 Monocryl and the skin was closed with a combination of interrupted subcuticular sutures of 4-0 Monocryl and dermal adhesive. The patient tolerated the procedure well and was transferred the postoperative care area stable and in good condition with plans to be discharged to home later the same day.     Complications: None    Specimen: None    Estimated Blood Loss: 10 ml    Total IV Fluids:   800 ml of LR                        Condition: good

## 2018-10-05 ENCOUNTER — TELEPHONE (OUTPATIENT)
Dept: FAMILY MEDICINE CLINIC | Age: 73
End: 2018-10-05

## 2018-12-17 ENCOUNTER — OFFICE VISIT (OUTPATIENT)
Dept: FAMILY MEDICINE CLINIC | Age: 73
End: 2018-12-17
Payer: MEDICARE

## 2018-12-17 VITALS
HEART RATE: 76 BPM | BODY MASS INDEX: 32.1 KG/M2 | HEIGHT: 64 IN | DIASTOLIC BLOOD PRESSURE: 84 MMHG | OXYGEN SATURATION: 96 % | SYSTOLIC BLOOD PRESSURE: 124 MMHG | WEIGHT: 188 LBS

## 2018-12-17 DIAGNOSIS — Z00.00 WELL ADULT EXAM: Primary | ICD-10-CM

## 2018-12-17 DIAGNOSIS — I26.99 OTHER PULMONARY EMBOLISM WITHOUT ACUTE COR PULMONALE, UNSPECIFIED CHRONICITY (HCC): ICD-10-CM

## 2018-12-17 DIAGNOSIS — Z85.46 HISTORY OF PROSTATE CANCER: ICD-10-CM

## 2018-12-17 PROCEDURE — G8484 FLU IMMUNIZE NO ADMIN: HCPCS | Performed by: FAMILY MEDICINE

## 2018-12-17 PROCEDURE — 90732 PPSV23 VACC 2 YRS+ SUBQ/IM: CPT | Performed by: FAMILY MEDICINE

## 2018-12-17 PROCEDURE — G0439 PPPS, SUBSEQ VISIT: HCPCS | Performed by: FAMILY MEDICINE

## 2018-12-17 PROCEDURE — 4040F PNEUMOC VAC/ADMIN/RCVD: CPT | Performed by: FAMILY MEDICINE

## 2018-12-17 PROCEDURE — G0009 ADMIN PNEUMOCOCCAL VACCINE: HCPCS | Performed by: FAMILY MEDICINE

## 2018-12-17 ASSESSMENT — PATIENT HEALTH QUESTIONNAIRE - PHQ9
SUM OF ALL RESPONSES TO PHQ9 QUESTIONS 1 & 2: 0
SUM OF ALL RESPONSES TO PHQ QUESTIONS 1-9: 0
SUM OF ALL RESPONSES TO PHQ QUESTIONS 1-9: 0
1. LITTLE INTEREST OR PLEASURE IN DOING THINGS: 0
2. FEELING DOWN, DEPRESSED OR HOPELESS: 0

## 2019-08-13 ENCOUNTER — OFFICE VISIT (OUTPATIENT)
Dept: FAMILY MEDICINE CLINIC | Age: 74
End: 2019-08-13
Payer: MEDICARE

## 2019-08-13 ENCOUNTER — HOSPITAL ENCOUNTER (OUTPATIENT)
Age: 74
Setting detail: SPECIMEN
Discharge: HOME | End: 2019-08-13
Payer: MEDICARE

## 2019-08-13 VITALS
DIASTOLIC BLOOD PRESSURE: 80 MMHG | BODY MASS INDEX: 28.97 KG/M2 | WEIGHT: 168.8 LBS | HEART RATE: 73 BPM | SYSTOLIC BLOOD PRESSURE: 136 MMHG | OXYGEN SATURATION: 97 %

## 2019-08-13 DIAGNOSIS — R41.3 MEMORY LOSS: ICD-10-CM

## 2019-08-13 DIAGNOSIS — R10.9 ACUTE LEFT FLANK PAIN: ICD-10-CM

## 2019-08-13 DIAGNOSIS — R10.9 ACUTE LEFT FLANK PAIN: Primary | ICD-10-CM

## 2019-08-13 LAB
ABSOLUTE EOS #: <0.03 K/UL (ref 0–0.44)
ABSOLUTE IMMATURE GRANULOCYTE: 0.05 K/UL (ref 0–0.3)
ABSOLUTE LYMPH #: 2.07 K/UL (ref 1.1–3.7)
ABSOLUTE MONO #: 0.71 K/UL (ref 0.1–1.2)
ALBUMIN SERPL-MCNC: 4.4 G/DL (ref 3.5–5.2)
ALBUMIN/GLOBULIN RATIO: 1.4 (ref 1–2.5)
ALP BLD-CCNC: 66 U/L (ref 40–129)
ALT SERPL-CCNC: 16 U/L (ref 5–41)
AMYLASE: 85 U/L (ref 28–100)
ANION GAP SERPL CALCULATED.3IONS-SCNC: 14 MMOL/L (ref 9–17)
AST SERPL-CCNC: 26 U/L
BASOPHILS # BLD: 1 % (ref 0–2)
BASOPHILS ABSOLUTE: 0.06 K/UL (ref 0–0.2)
BILIRUB SERPL-MCNC: 1.11 MG/DL (ref 0.3–1.2)
BUN BLDV-MCNC: 20 MG/DL (ref 8–23)
BUN/CREAT BLD: ABNORMAL (ref 9–20)
CALCIUM SERPL-MCNC: 10 MG/DL (ref 8.6–10.4)
CHLORIDE BLD-SCNC: 103 MMOL/L (ref 98–107)
CO2: 23 MMOL/L (ref 20–31)
CREAT SERPL-MCNC: 1.42 MG/DL (ref 0.7–1.2)
DIFFERENTIAL TYPE: ABNORMAL
EOSINOPHILS RELATIVE PERCENT: 0 % (ref 1–4)
FOLATE: >20 NG/ML
GFR AFRICAN AMERICAN: 60 ML/MIN
GFR NON-AFRICAN AMERICAN: 49 ML/MIN
GFR SERPL CREATININE-BSD FRML MDRD: ABNORMAL ML/MIN/{1.73_M2}
GFR SERPL CREATININE-BSD FRML MDRD: ABNORMAL ML/MIN/{1.73_M2}
GLUCOSE BLD-MCNC: 141 MG/DL (ref 70–99)
HCT VFR BLD CALC: 52.7 % (ref 40.7–50.3)
HEMOGLOBIN: 16.5 G/DL (ref 13–17)
IMMATURE GRANULOCYTES: 1 %
LIPASE: 29 U/L (ref 13–60)
LYMPHOCYTES # BLD: 21 % (ref 24–43)
MCH RBC QN AUTO: 28.7 PG (ref 25.2–33.5)
MCHC RBC AUTO-ENTMCNC: 31.3 G/DL (ref 28.4–34.8)
MCV RBC AUTO: 91.8 FL (ref 82.6–102.9)
MONOCYTES # BLD: 7 % (ref 3–12)
NRBC AUTOMATED: 0 PER 100 WBC
PDW BLD-RTO: 13.9 % (ref 11.8–14.4)
PLATELET # BLD: 232 K/UL (ref 138–453)
PLATELET ESTIMATE: ABNORMAL
PMV BLD AUTO: 12.2 FL (ref 8.1–13.5)
POTASSIUM SERPL-SCNC: 4.6 MMOL/L (ref 3.7–5.3)
RBC # BLD: 5.74 M/UL (ref 4.21–5.77)
RBC # BLD: ABNORMAL 10*6/UL
SEG NEUTROPHILS: 70 % (ref 36–65)
SEGMENTED NEUTROPHILS ABSOLUTE COUNT: 7.02 K/UL (ref 1.5–8.1)
SODIUM BLD-SCNC: 140 MMOL/L (ref 135–144)
TOTAL PROTEIN: 7.6 G/DL (ref 6.4–8.3)
TSH SERPL DL<=0.05 MIU/L-ACNC: 0.61 MIU/L (ref 0.3–5)
VITAMIN B-12: 415 PG/ML (ref 232–1245)
WBC # BLD: 9.9 K/UL (ref 3.5–11.3)
WBC # BLD: ABNORMAL 10*3/UL

## 2019-08-13 PROCEDURE — 3017F COLORECTAL CA SCREEN DOC REV: CPT | Performed by: FAMILY MEDICINE

## 2019-08-13 PROCEDURE — G8417 CALC BMI ABV UP PARAM F/U: HCPCS | Performed by: FAMILY MEDICINE

## 2019-08-13 PROCEDURE — 4040F PNEUMOC VAC/ADMIN/RCVD: CPT | Performed by: FAMILY MEDICINE

## 2019-08-13 PROCEDURE — 1036F TOBACCO NON-USER: CPT | Performed by: FAMILY MEDICINE

## 2019-08-13 PROCEDURE — G8427 DOCREV CUR MEDS BY ELIG CLIN: HCPCS | Performed by: FAMILY MEDICINE

## 2019-08-13 PROCEDURE — 99213 OFFICE O/P EST LOW 20 MIN: CPT | Performed by: FAMILY MEDICINE

## 2019-08-13 PROCEDURE — 1123F ACP DISCUSS/DSCN MKR DOCD: CPT | Performed by: FAMILY MEDICINE

## 2019-08-13 ASSESSMENT — PATIENT HEALTH QUESTIONNAIRE - PHQ9
2. FEELING DOWN, DEPRESSED OR HOPELESS: 0
1. LITTLE INTEREST OR PLEASURE IN DOING THINGS: 0
SUM OF ALL RESPONSES TO PHQ9 QUESTIONS 1 & 2: 0
SUM OF ALL RESPONSES TO PHQ QUESTIONS 1-9: 0
SUM OF ALL RESPONSES TO PHQ QUESTIONS 1-9: 0

## 2019-08-13 ASSESSMENT — ENCOUNTER SYMPTOMS
WHEEZING: 0
NAUSEA: 1
COUGH: 0
BLOOD IN STOOL: 0
VOMITING: 1
SHORTNESS OF BREATH: 0
CONSTIPATION: 0
BACK PAIN: 1
ABDOMINAL PAIN: 0
DIARRHEA: 0

## 2019-08-21 ENCOUNTER — TELEPHONE (OUTPATIENT)
Dept: FAMILY MEDICINE CLINIC | Age: 74
End: 2019-08-21

## 2019-08-21 DIAGNOSIS — R10.9 ACUTE LEFT FLANK PAIN: ICD-10-CM

## 2019-08-26 ENCOUNTER — OFFICE VISIT (OUTPATIENT)
Dept: FAMILY MEDICINE CLINIC | Age: 74
End: 2019-08-26
Payer: MEDICARE

## 2019-08-26 ENCOUNTER — HOSPITAL ENCOUNTER (OUTPATIENT)
Age: 74
Setting detail: SPECIMEN
Discharge: HOME | End: 2019-08-26
Payer: MEDICARE

## 2019-08-26 VITALS
SYSTOLIC BLOOD PRESSURE: 140 MMHG | HEART RATE: 72 BPM | BODY MASS INDEX: 31.58 KG/M2 | DIASTOLIC BLOOD PRESSURE: 100 MMHG | OXYGEN SATURATION: 96 % | WEIGHT: 184 LBS

## 2019-08-26 DIAGNOSIS — N20.0 RENAL STONE: ICD-10-CM

## 2019-08-26 DIAGNOSIS — R79.89 ELEVATED SERUM CREATININE: Primary | ICD-10-CM

## 2019-08-26 DIAGNOSIS — R79.89 ELEVATED SERUM CREATININE: ICD-10-CM

## 2019-08-26 LAB
ANION GAP SERPL CALCULATED.3IONS-SCNC: 12 MMOL/L (ref 9–17)
BUN BLDV-MCNC: 14 MG/DL (ref 8–23)
BUN/CREAT BLD: NORMAL (ref 9–20)
CALCIUM SERPL-MCNC: 9.9 MG/DL (ref 8.6–10.4)
CHLORIDE BLD-SCNC: 102 MMOL/L (ref 98–107)
CO2: 25 MMOL/L (ref 20–31)
CREAT SERPL-MCNC: 1.05 MG/DL (ref 0.7–1.2)
GFR AFRICAN AMERICAN: >60 ML/MIN
GFR NON-AFRICAN AMERICAN: >60 ML/MIN
GFR SERPL CREATININE-BSD FRML MDRD: NORMAL ML/MIN/{1.73_M2}
GFR SERPL CREATININE-BSD FRML MDRD: NORMAL ML/MIN/{1.73_M2}
GLUCOSE BLD-MCNC: 91 MG/DL (ref 70–99)
POTASSIUM SERPL-SCNC: 4.6 MMOL/L (ref 3.7–5.3)
SODIUM BLD-SCNC: 139 MMOL/L (ref 135–144)

## 2019-08-26 PROCEDURE — 99214 OFFICE O/P EST MOD 30 MIN: CPT | Performed by: FAMILY MEDICINE

## 2019-08-26 PROCEDURE — 3017F COLORECTAL CA SCREEN DOC REV: CPT | Performed by: FAMILY MEDICINE

## 2019-08-26 PROCEDURE — 1123F ACP DISCUSS/DSCN MKR DOCD: CPT | Performed by: FAMILY MEDICINE

## 2019-08-26 PROCEDURE — G8417 CALC BMI ABV UP PARAM F/U: HCPCS | Performed by: FAMILY MEDICINE

## 2019-08-26 PROCEDURE — G8427 DOCREV CUR MEDS BY ELIG CLIN: HCPCS | Performed by: FAMILY MEDICINE

## 2019-08-26 PROCEDURE — 4040F PNEUMOC VAC/ADMIN/RCVD: CPT | Performed by: FAMILY MEDICINE

## 2019-08-26 PROCEDURE — 1036F TOBACCO NON-USER: CPT | Performed by: FAMILY MEDICINE

## 2020-11-17 ENCOUNTER — OFFICE VISIT (OUTPATIENT)
Dept: FAMILY MEDICINE CLINIC | Age: 75
End: 2020-11-17
Payer: MEDICARE

## 2020-11-17 VITALS
HEART RATE: 86 BPM | OXYGEN SATURATION: 94 % | HEIGHT: 64 IN | TEMPERATURE: 98.1 F | BODY MASS INDEX: 32.44 KG/M2 | SYSTOLIC BLOOD PRESSURE: 132 MMHG | WEIGHT: 190 LBS | DIASTOLIC BLOOD PRESSURE: 71 MMHG

## 2020-11-17 PROBLEM — C61 MALIGNANT TUMOR OF PROSTATE (HCC): Status: ACTIVE | Noted: 2020-11-17

## 2020-11-17 PROCEDURE — G0439 PPPS, SUBSEQ VISIT: HCPCS | Performed by: NURSE PRACTITIONER

## 2020-11-17 PROCEDURE — 99497 ADVNCD CARE PLAN 30 MIN: CPT | Performed by: NURSE PRACTITIONER

## 2020-11-17 RX ORDER — SILDENAFIL CITRATE 20 MG/1
TABLET ORAL
COMMUNITY

## 2020-11-17 SDOH — ECONOMIC STABILITY: INCOME INSECURITY: HOW HARD IS IT FOR YOU TO PAY FOR THE VERY BASICS LIKE FOOD, HOUSING, MEDICAL CARE, AND HEATING?: NOT HARD AT ALL

## 2020-11-17 SDOH — ECONOMIC STABILITY: FOOD INSECURITY: WITHIN THE PAST 12 MONTHS, YOU WORRIED THAT YOUR FOOD WOULD RUN OUT BEFORE YOU GOT MONEY TO BUY MORE.: NEVER TRUE

## 2020-11-17 SDOH — ECONOMIC STABILITY: TRANSPORTATION INSECURITY
IN THE PAST 12 MONTHS, HAS LACK OF TRANSPORTATION KEPT YOU FROM MEETINGS, WORK, OR FROM GETTING THINGS NEEDED FOR DAILY LIVING?: NO

## 2020-11-17 SDOH — ECONOMIC STABILITY: FOOD INSECURITY: WITHIN THE PAST 12 MONTHS, THE FOOD YOU BOUGHT JUST DIDN'T LAST AND YOU DIDN'T HAVE MONEY TO GET MORE.: NEVER TRUE

## 2020-11-17 SDOH — ECONOMIC STABILITY: TRANSPORTATION INSECURITY
IN THE PAST 12 MONTHS, HAS THE LACK OF TRANSPORTATION KEPT YOU FROM MEDICAL APPOINTMENTS OR FROM GETTING MEDICATIONS?: NO

## 2020-11-17 ASSESSMENT — PATIENT HEALTH QUESTIONNAIRE - PHQ9
2. FEELING DOWN, DEPRESSED OR HOPELESS: 0
SUM OF ALL RESPONSES TO PHQ QUESTIONS 1-9: 0
SUM OF ALL RESPONSES TO PHQ9 QUESTIONS 1 & 2: 0
SUM OF ALL RESPONSES TO PHQ QUESTIONS 1-9: 0
1. LITTLE INTEREST OR PLEASURE IN DOING THINGS: 0
SUM OF ALL RESPONSES TO PHQ QUESTIONS 1-9: 0

## 2020-11-17 ASSESSMENT — LIFESTYLE VARIABLES: HOW OFTEN DO YOU HAVE A DRINK CONTAINING ALCOHOL: 0

## 2020-11-17 NOTE — PATIENT INSTRUCTIONS
Personalized Preventive Plan for Fito Salazar - 11/17/2020  Medicare offers a range of preventive health benefits. Some of the tests and screenings are paid in full while other may be subject to a deductible, co-insurance, and/or copay. Some of these benefits include a comprehensive review of your medical history including lifestyle, illnesses that may run in your family, and various assessments and screenings as appropriate. After reviewing your medical record and screening and assessments performed today your provider may have ordered immunizations, labs, imaging, and/or referrals for you. A list of these orders (if applicable) as well as your Preventive Care list are included within your After Visit Summary for your review. Other Preventive Recommendations:    · A preventive eye exam performed by an eye specialist is recommended every 1-2 years to screen for glaucoma; cataracts, macular degeneration, and other eye disorders. · A preventive dental visit is recommended every 6 months. · Try to get at least 150 minutes of exercise per week or 10,000 steps per day on a pedometer . · Order or download the FREE \"Exercise & Physical Activity: Your Everyday Guide\" from [employer]. Call 2-898.580.6950 or search [employer] online. · You need 6767-1486 mg of calcium and 6122-5562 IU of vitamin D per day. It is possible to meet your calcium requirement with diet alone, but a vitamin D supplement is usually necessary to meet this goal.  · When exposed to the sun, use a sunscreen that protects against both UVA and UVB radiation with an SPF of 30 or greater. Reapply every 2 to 3 hours or after sweating, drying off with a towel, or swimming. · Always wear a seat belt when traveling in a car. Always wear a helmet when riding a bicycle or motorcycle.

## 2020-11-17 NOTE — PROGRESS NOTES
Medicare Annual Wellness Visit  Name: Leslie Reina Date: 2020   MRN: [id number] Sex: Male   Age: 70 y.o. Ethnicity: Non-/Non    : 1945 Race: Toi Stuart is here for Medicare AWV    Screenings for behavioral, psychosocial and functional/safety risks, and cognitive dysfunction are all negative except as indicated below. These results, as well as other patient data from the [address] form, are documented in Flowsheets linked to this Encounter. No Known Allergies      Prior to Visit Medications    Medication Sig Taking? Authorizing Provider   rivaroxaban (XARELTO) 20 MG TABS tablet TAKE 1 TABLET BY MOUTH EVERY DAY Yes Vitaly Jefferson MD   Apoaequorin (PREVAGEN PO) Take by mouth Yes Historical Provider, MD   sildenafil (REVATIO) 20 MG tablet sildenafil (pulmonary hypertension) 20 mg tablet   Take 1 tablet 3 times a day by oral route. Historical Provider, MD         Past Medical History:   Diagnosis Date    Arthritis     Hand, bilateral and Posterior Neck    Cancer ([address]     Prostate, radiation seeds implanted.  Foot fracture, right     GERD (gastroesophageal reflux disease)     Tums takes it away per pt.  Hx of blood clots     Bilateral Lung, Left Leg    Pulmonary embolism ([address]     Shoulder fracture, right     Wears glasses        Past Surgical History:   Procedure Laterality Date    COLONOSCOPY      HERNIA REPAIR Right     Hernia at 10yrs old approx. and  Hernia repair. Unknown type per pt.  [facility]    LUNG SURGERY Left     lung biopsy    SD REPAIR ING HERNIA,5+Y/O,REDUCIBL Right 2018    RECURRENT HERNIA INGUINAL REPAIR RIGHT WITH MESH performed by Yasmeen Duke MD at [address] [place] [address]       No family history on file.     CareTeam (Including outside providers/suppliers regularly involved in providing care):   Patient Care Team:  Vitaly Jefferson MD as PCP - General (Family Medicine)  Letha Sherwood MD as PCP - [facility] Empaneled Provider  Marcia Zamarripa MD as Consulting Physician (Urology)  Dorota Valadez MD as Consulting Physician  Jc Tirado MD as Consulting Physician (Pulmonology)    Wt Readings from Last 3 Encounters:   11/17/20 190 lb (86.2 kg)   08/26/19 184 lb (83.5 kg)   08/13/19 168 lb 12.8 oz (76.6 kg)     Vitals:    11/17/20 1409   BP: 132/71   Site: Left Upper Arm   Position: Sitting   Cuff Size: Medium Adult   Pulse: 86   Temp: 98.1 °F (36.7 °C)   TempSrc: Temporal   SpO2: 94%   Weight: 190 lb (86.2 kg)   Height: 5' 4\" (1.626 m)     Body mass index is 32.61 kg/m². Based upon direct observation of the patient, evaluation of cognition reveals recent and remote memory intact. Patient's complete Health Risk Assessment and screening values have been reviewed and are found in Flowsheets. The following problems were reviewed today and where indicated follow up appointments were made and/or referrals ordered. Positive Risk Factor Screenings with Interventions:       General Health and ACP:  General  In general, how would you say your health is?: Excellent  In the past 7 days, have you experienced any of the following?  New or Increased Pain, New or Increased Fatigue, Loneliness, Social Isolation, Stress or Anger?: None of These  Do you get the social and emotional support that you need?: Yes  Do you have a Living Will?: (!) No  Advance Directives     Power of MARIA TERESA & WHITE SARAH Will ACP-Advance Directive ACP-Power of     Not on File Not on File Filed [address] [place] [address] Risk Interventions:  · No Living Will: Advance Care Planning addressed with patient today    Health Habits/Nutrition:  Health Habits/Nutrition  Do you exercise for at least 20 minutes 2-3 times per week?: Yes  Have you lost any weight without trying in the past 3 months?: No  Do you eat fewer than 2 meals per day?: No  Have you seen a dentist within the past year?: Yes  Body mass index: (!) 32.61  Health Habits/Nutrition Interventions:  · n/a    Hearing/Vision:  No exam data present  Hearing/Vision  Do you or your family notice any trouble with your hearing?: (!) Yes  Do you have difficulty driving, watching TV, or doing any of your daily activities because of your eyesight?: No  Have you had an eye exam within the past year?: Yes  Hearing/Vision Interventions:  · Hearing concerns:  patient declines any further evaluation/treatment for hearing issues, Has had hearing checked and no issues    Personalized Preventive Plan   Current Health Maintenance Status  Immunization History   Administered Date(s) Administered    Influenza 11/04/2013    Influenza Vaccine, unspecified formulation 11/04/2013    Influenza Virus Vaccine 10/17/2014, 11/12/2015, 11/01/2016, 10/10/2017, 10/15/2019    Influenza, High Dose (Fluzone 65 yrs and older) 10/02/2018, 09/23/2020    Influenza, High-dose, Quadv, 65 yrs +, IM (Fluzone) 09/23/2020    Influenza, Quadv, IM, (6 mo and older Fluzone, Flulaval, Fluarix and 3 yrs and older Afluria) 11/01/2016, 10/10/2017    Influenza, Quadv, IM, PF (6 mo and older Fluzone, Flulaval, Fluarix, and 3 yrs and older Afluria) 10/15/2019    Pneumococcal Conjugate 13-valent ([id number]) 12/12/2016    Pneumococcal Polysaccharide ([id number]) 11/04/2013, 12/17/2018        Health Maintenance   Topic Date Due    PSA counseling  09/11/2015    Annual Wellness Visit (AWV)  06/20/2019    DTaP/Tdap/Td vaccine (1 - Tdap) 11/17/2021 (Originally 9/6/1966)    Shingles Vaccine (1 of 2) 11/17/2021 (Originally 9/6/1997)    Lipid screen  12/20/2021    Colon cancer screen colonoscopy  01/16/2023    Flu vaccine  Completed    Pneumococcal 65+ years Vaccine  Completed    Hepatitis C screen  Completed    Hepatitis A vaccine  Aged Out    Hepatitis B vaccine  Aged Out    Hib vaccine  Aged Out    Meningococcal (ACWY) vaccine  Aged Out     Recommendations for [employer] Due: see orders and patient instructions/AVS.  . Recommended screening schedule for the next 5-10 years is provided to the patient in written form: see Patient Christopher Ackerman was seen today for medicare awv. Diagnoses and all orders for this visit:    Routine general medical examination at a health care facility  -     CBC Auto Differential; Future  -     Comprehensive Metabolic Panel; Future  -     TSH with Reflex; Future  -     Lipid, Fasting; Future    ACP (advance care planning)  -     DE ADVANCED CARE PLAN FACE TO [facility], [address] [[id number]]              Advance Care Planning   Advanced Care Planning: Discussed the patients choices for care and treatment in case of a health event that adversely affects decision-making abilities. Also discussed the patients long-term treatment options. Reviewed with the patient the [address] [place] [address] Declaration forms  Reviewed the process of designating a competent adult as an Agent (or -in-fact) that could take make health care decisions for the patient if incompetent. Patient was asked to complete the declaration forms, either acknowledge the forms by a public notary or an eligible witness and provide a signed copy to the practice office.   Time spent (minutes): 5      Electronically Signed by: ON Lamar-CNP

## 2020-11-22 LAB
ABSOLUTE BASO #: 0.1 X10E9/L (ref 0–0.2)
ABSOLUTE EOS #: 0.2 X10E9/L (ref 0–0.4)
ABSOLUTE LYMPH #: 2.8 X10E9/L (ref 1–3.5)
ABSOLUTE MONO #: 0.7 X10E9/L (ref 0–0.9)
ABSOLUTE NEUT #: 3.1 X10E9/L (ref 1.5–6.6)
ALBUMIN SERPL-MCNC: 4.4 G/DL (ref 3.2–5.3)
ALK PHOSPHATASE: 57 U/L (ref 39–130)
ALT SERPL-CCNC: 20 U/L (ref 0–40)
ANION GAP SERPL CALCULATED.3IONS-SCNC: 9 MMOL/L (ref 5–15)
AST SERPL-CCNC: 27 U/L (ref 0–41)
BASOPHILS RELATIVE PERCENT: 1.2 %
BILIRUB SERPL-MCNC: 1.3 MG/DL (ref 0.3–1.2)
BUN BLDV-MCNC: 18 MG/DL (ref 5–27)
CALCIUM SERPL-MCNC: 9.8 MG/DL (ref 8.5–10.5)
CHLORIDE BLD-SCNC: 108 MMOL/L (ref 98–109)
CHOLESTEROL/HDL RATIO: 4.6 (ref 1–5)
CHOLESTEROL: 152 MG/DL (ref 150–200)
CO2: 27 MMOL/L (ref 22–32)
CREAT SERPL-MCNC: 1.07 MG/DL (ref 0.6–1.3)
EGFR AFRICAN AMERICAN: >60 ML/MIN/1.73SQ.M
EGFR IF NONAFRICAN AMERICAN: >60 ML/MIN/1.73SQ.M
EOSINOPHILS RELATIVE PERCENT: 3.4 %
GLUCOSE: 96 MG/DL (ref 65–99)
HCT VFR BLD CALC: 47.8 % (ref 39–49)
HDLC SERPL-MCNC: 33 MG/DL
HEMOGLOBIN: 15.9 G/DL (ref 13–17)
LDL CHOLESTEROL CALCULATED: 87 MG/DL
LDL/HDL RATIO: 2.6
LYMPHOCYTE %: 40.2 %
MCH RBC QN AUTO: 28.9 PG (ref 27–34)
MCHC RBC AUTO-ENTMCNC: 33.3 G/DL (ref 32–36)
MCV RBC AUTO: 87 FL (ref 80–100)
MONOCYTES # BLD: 10.6 %
NEUTROPHILS RELATIVE PERCENT: 44.6 %
PDW BLD-RTO: 13.6 % (ref 11.5–15)
PLATELETS: 198 X10E9/L (ref 150–450)
PMV BLD AUTO: 10.3 FL (ref 7–12)
POTASSIUM SERPL-SCNC: 4.5 MMOL/L (ref 3.5–5)
RBC: 5.51 X10E12/L (ref 4.1–5.7)
SODIUM BLD-SCNC: 144 MMOL/L (ref 134–146)
TOTAL PROTEIN: 7.2 G/DL (ref 6–8)
TRIGL SERPL-MCNC: 159 MG/DL (ref 27–150)
TSH SERPL DL<=0.05 MIU/L-ACNC: 0.6 UIU/ML (ref 0.49–4.67)
VLDLC SERPL CALC-MCNC: 32 MG/DL (ref 0–30)
WBC: 6.9 X10E9/L (ref 4–11)

## 2021-05-18 ENCOUNTER — OFFICE VISIT (OUTPATIENT)
Dept: FAMILY MEDICINE CLINIC | Age: 76
End: 2021-05-18
Payer: MEDICARE

## 2021-05-18 ENCOUNTER — HOSPITAL ENCOUNTER (OUTPATIENT)
Age: 76
Setting detail: SPECIMEN
Discharge: HOME | End: 2021-05-18
Payer: MEDICARE

## 2021-05-18 VITALS
OXYGEN SATURATION: 94 % | HEART RATE: 55 BPM | SYSTOLIC BLOOD PRESSURE: 118 MMHG | BODY MASS INDEX: 32.44 KG/M2 | WEIGHT: 189 LBS | DIASTOLIC BLOOD PRESSURE: 80 MMHG

## 2021-05-18 DIAGNOSIS — R73.9 HYPERGLYCEMIA: ICD-10-CM

## 2021-05-18 DIAGNOSIS — Z76.0 MEDICATION REFILL: ICD-10-CM

## 2021-05-18 DIAGNOSIS — R40.4 TRANSIENT ALTERATION OF AWARENESS: Primary | ICD-10-CM

## 2021-05-18 DIAGNOSIS — R40.4 TRANSIENT ALTERATION OF AWARENESS: ICD-10-CM

## 2021-05-18 DIAGNOSIS — E55.9 VITAMIN D DEFICIENCY: ICD-10-CM

## 2021-05-18 LAB
-: ABNORMAL
ABSOLUTE EOS #: 0.23 K/UL (ref 0–0.44)
ABSOLUTE IMMATURE GRANULOCYTE: 0.06 K/UL (ref 0–0.3)
ABSOLUTE LYMPH #: 2.65 K/UL (ref 1.1–3.7)
ABSOLUTE MONO #: 1.41 K/UL (ref 0.1–1.2)
ALBUMIN SERPL-MCNC: 4.5 G/DL (ref 3.5–5.2)
ALT SERPL-CCNC: 19 U/L (ref 5–41)
AMORPHOUS: ABNORMAL
ANION GAP SERPL CALCULATED.3IONS-SCNC: 13 MMOL/L (ref 9–17)
AST SERPL-CCNC: 27 U/L
BACTERIA: ABNORMAL
BASOPHILS # BLD: 1 % (ref 0–2)
BASOPHILS ABSOLUTE: 0.1 K/UL (ref 0–0.2)
BILIRUBIN URINE: NEGATIVE
BUN BLDV-MCNC: 16 MG/DL (ref 8–23)
BUN/CREAT BLD: NORMAL (ref 9–20)
CALCIUM SERPL-MCNC: 9.7 MG/DL (ref 8.6–10.4)
CASTS UA: ABNORMAL /LPF (ref 0–8)
CHLORIDE BLD-SCNC: 107 MMOL/L (ref 98–107)
CO2: 23 MMOL/L (ref 20–31)
COLOR: ABNORMAL
CREAT SERPL-MCNC: 1.07 MG/DL (ref 0.7–1.2)
CRYSTALS, UA: ABNORMAL /HPF
DIFFERENTIAL TYPE: ABNORMAL
EOSINOPHILS RELATIVE PERCENT: 2 % (ref 1–4)
EPITHELIAL CELLS UA: ABNORMAL /HPF (ref 0–5)
ESTIMATED AVERAGE GLUCOSE: 117 MG/DL
GFR AFRICAN AMERICAN: >60 ML/MIN
GFR NON-AFRICAN AMERICAN: >60 ML/MIN
GFR SERPL CREATININE-BSD FRML MDRD: NORMAL ML/MIN/{1.73_M2}
GFR SERPL CREATININE-BSD FRML MDRD: NORMAL ML/MIN/{1.73_M2}
GLUCOSE BLD-MCNC: 83 MG/DL (ref 70–99)
GLUCOSE URINE: NEGATIVE
HBA1C MFR BLD: 5.7 % (ref 4–6)
HCT VFR BLD CALC: 51.5 % (ref 40.7–50.3)
HEMOGLOBIN: 15.9 G/DL (ref 13–17)
IMMATURE GRANULOCYTES: 1 %
KETONES, URINE: NEGATIVE
LEUKOCYTE ESTERASE, URINE: NEGATIVE
LYMPHOCYTES # BLD: 25 % (ref 24–43)
MAGNESIUM: 2.4 MG/DL (ref 1.6–2.6)
MCH RBC QN AUTO: 28.1 PG (ref 25.2–33.5)
MCHC RBC AUTO-ENTMCNC: 30.9 G/DL (ref 28.4–34.8)
MCV RBC AUTO: 91 FL (ref 82.6–102.9)
MONOCYTES # BLD: 13 % (ref 3–12)
MUCUS: ABNORMAL
NITRITE, URINE: NEGATIVE
NRBC AUTOMATED: 0 PER 100 WBC
OTHER OBSERVATIONS UA: ABNORMAL
PDW BLD-RTO: 14.2 % (ref 11.8–14.4)
PH UA: 5 (ref 5–8)
PHOSPHORUS: 3.6 MG/DL (ref 2.5–4.5)
PLATELET # BLD: 225 K/UL (ref 138–453)
PLATELET ESTIMATE: ABNORMAL
PMV BLD AUTO: 12.3 FL (ref 8.1–13.5)
POTASSIUM SERPL-SCNC: 4.6 MMOL/L (ref 3.7–5.3)
PROTEIN UA: NEGATIVE
RBC # BLD: 5.66 M/UL (ref 4.21–5.77)
RBC # BLD: ABNORMAL 10*6/UL
RBC UA: ABNORMAL /HPF (ref 0–4)
RENAL EPITHELIAL, UA: ABNORMAL /HPF
SEG NEUTROPHILS: 58 % (ref 36–65)
SEGMENTED NEUTROPHILS ABSOLUTE COUNT: 6.38 K/UL (ref 1.5–8.1)
SODIUM BLD-SCNC: 143 MMOL/L (ref 135–144)
SPECIFIC GRAVITY UA: 1.03 (ref 1–1.03)
TRICHOMONAS: ABNORMAL
TSH SERPL DL<=0.05 MIU/L-ACNC: 0.9 MIU/L (ref 0.3–5)
TURBIDITY: ABNORMAL
URINE HGB: NEGATIVE
UROBILINOGEN, URINE: NORMAL
VITAMIN D 25-HYDROXY: 50.1 NG/ML (ref 30–100)
WBC # BLD: 10.8 K/UL (ref 3.5–11.3)
WBC # BLD: ABNORMAL 10*3/UL
WBC UA: ABNORMAL /HPF (ref 0–5)
YEAST: ABNORMAL

## 2021-05-18 PROCEDURE — 4040F PNEUMOC VAC/ADMIN/RCVD: CPT | Performed by: FAMILY MEDICINE

## 2021-05-18 PROCEDURE — 3017F COLORECTAL CA SCREEN DOC REV: CPT | Performed by: FAMILY MEDICINE

## 2021-05-18 PROCEDURE — 99214 OFFICE O/P EST MOD 30 MIN: CPT | Performed by: FAMILY MEDICINE

## 2021-05-18 PROCEDURE — 1036F TOBACCO NON-USER: CPT | Performed by: FAMILY MEDICINE

## 2021-05-18 PROCEDURE — G8417 CALC BMI ABV UP PARAM F/U: HCPCS | Performed by: FAMILY MEDICINE

## 2021-05-18 PROCEDURE — G8427 DOCREV CUR MEDS BY ELIG CLIN: HCPCS | Performed by: FAMILY MEDICINE

## 2021-05-18 PROCEDURE — 1123F ACP DISCUSS/DSCN MKR DOCD: CPT | Performed by: FAMILY MEDICINE

## 2021-05-18 ASSESSMENT — PATIENT HEALTH QUESTIONNAIRE - PHQ9
2. FEELING DOWN, DEPRESSED OR HOPELESS: 0
SUM OF ALL RESPONSES TO PHQ QUESTIONS 1-9: 0
SUM OF ALL RESPONSES TO PHQ QUESTIONS 1-9: 0
1. LITTLE INTEREST OR PLEASURE IN DOING THINGS: 0
SUM OF ALL RESPONSES TO PHQ QUESTIONS 1-9: 0

## 2021-05-18 NOTE — PROGRESS NOTES
[facility]    LUNG SURGERY Left 2006    lung biopsy    [place] REPAIR ING HERNIA,5+Y/O,REDUCIBL Right 7/24/2018    RECURRENT HERNIA INGUINAL REPAIR RIGHT WITH MESH performed by Elisa Sims MD at [address] [place] [address]       No family history on file. Social History:     Social History     Socioeconomic History    Marital status:      Spouse name: Not on file    Number of children: 2    Years of education: 12    Highest education level: Not on file   Occupational History    Occupation: retired   Tobacco Use    Smoking status: Never Smoker    Smokeless tobacco: Never Used   Vaping Use    Vaping Use: Never used   Substance and Sexual Activity    Alcohol use: Not on file     Comment: Occasion    Drug use: No    Sexual activity: Yes     Partners: Female   Other Topics Concern    Not on file   Social History Narrative    Diet - average    Caffeine intake per day - none    Exercise pattern - walk    Living situation - house, alone    How would you describe yourself as a ?good    Any MVA's in the past 2 years that you were at fault for?no    Any tickets in the past 2 years?no    How often do you wear a seatbelt? always     Social Determinants of Health     Financial Resource Strain: Low Risk     Difficulty of Paying Living Expenses: Not hard at all   Food Insecurity: No Food Insecurity    Worried About Running Out of Food in the Last Year: Never true    Dori of Food in the Last Year: Never true   Transportation Needs: No Transportation Needs    Lack of Transportation (Medical): No    Lack of Transportation (Non-Medical):  No   Physical Activity:     Days of Exercise per Week:     Minutes of Exercise per Session:    Stress:     Feeling of Stress :    Social Connections:     Frequency of Communication with Friends and Family:     Frequency of Social Gatherings with Friends and Family:     Attends Zoroastrian Services:     Active Member of Clubs or Organizations:     Attends Club or Organization Meetings:     Marital Status:    Intimate Partner Violence:     Fear of Current or Ex-Partner:     Emotionally Abused:     Physically Abused:     Sexually Abused:         ROS:     Constitutional: No fevers, chills, fatigue. ENT: No nasal congestion or sore throat  Respiratory: No difficulty in breathing or cough. Cardiovascular: No chest pain, palpitations or shortness of breath  Gastrointestinal: No abdominal pain or change in bowel movements. Genitourinary: No change in urinary frequency or dysuria. Skin: No rashes or skin lesions. Neurological: No weakness. No headaches. +memory loss         Last Filed Vitals:  /80   Pulse 55   Wt 189 lb (85.7 kg)   SpO2 94%   BMI 32.44 kg/m²      Physical Examination:     GENERAL APPEARANCE: in no acute distress, well developed, well nourished. HEAD: normocephalic, atraumatic. EYES: extraocular movement intact (EOMI), pupils equal, round, reactive to light and accommodation. EARS: normal, tympanic membrane intact, clear, auditory canal clear. NOSE: nares patent, no erythema, sinuses nontender bilaterally, no rhinorrhea. ORAL CAVITY: mucosa moist, no lesions. THROAT: clear, no mass, no exudate. NECK/THYROID: neck supple, full range of motion, no thyromegaly. HEART: no murmurs, regular rate and rhythm, S1, S2 normal.   LUNGS: clear to auscultation bilaterally, no wheezes, rales, rhonchi.    ABDOMEN: normal, bowel sounds present, soft, nontender, nondistended, no rebound guarding or rigidity  NEURO: CN 2-12 grossly intact; sensorimotor grossly intact    Recent Labs/ In Office Testing/ Radiograph review:     Office Visit on 11/17/2020   Component Date Value Ref Range Status    WBC 11/21/2020 6.9  4.0 - 11.0 X10E9/L Final    RBC 11/21/2020 5.51  4.10 - 5.70 X10E12/L Final    Hemoglobin 11/21/2020 15.9  13.0 - 17.0 g/dL Final    Hematocrit 11/21/2020 47.8  39 - 49 % Final    MCV 11/21/2020 87  80 - 100 fL Final    MCH 11/21/2020 28.9  27 - 34 pg Final    MCHC 11/21/2020 33.3  32 - 36 g/dL Final    RDW 11/21/2020 13.6  11.5 - 15.0 % Final    Platelets 02/02/0906 198  150 - 450 X10E9/L Final    MPV 11/21/2020 10.3  7 - 12 fL Final    Neutrophils % 11/21/2020 44.6  % Final    Absolute Neut # 11/21/2020 3.1  1.5 - 6.6 X10E9/L Final    Lymphocyte % 11/21/2020 40.2  % Final    Absolute Lymph # 11/21/2020 2.8  1.0 - 3.5 X10E9/L Final    Monocytes 11/21/2020 10.6  % Final    Absolute Mono # 11/21/2020 0.7  0 - 0.9 X10E9/L Final    Eosinophils % 11/21/2020 3.4  % Final    Absolute Eos # 11/21/2020 0.2  0.0 - 0.4 X10E9/L Final    Basophils % 11/21/2020 1.2  % Final    Absolute Baso # 11/21/2020 0.1  0.0 - 0.2 X10E9/L Final    Comment: Performed at [facility]. [facility]  [address] [facility] [address]      Glucose 11/21/2020 96  65 - 99 mg/dL Final    BUN 11/21/2020 18  5 - 27 mg/dL Final    CREATININE 11/21/2020 1.07  0.60 - 1.30 mg/dL Final    METHOD TRACEABLE TO IDMS STANDARD    eGFR  11/21/2020 >60  >59 ml/min/1.73sq. m Final    EGFR IF NonAfrican American 11/21/2020 >60  >59 ml/min/1.73sq. m Final    Calcium 11/21/2020 9.8  8.5 - 10.5 mg/dL Final    Sodium 11/21/2020 144  134 - 146 mmol/L Final    Potassium 11/21/2020 4.5  3.5 - 5.0 mmol/L Final    Chloride 11/21/2020 108  98 - 109 mmol/L Final    CO2 11/21/2020 27  22 - 32 mmol/L Final    Anion Gap 11/21/2020 9  5 - 15 mmol/L Final    Total Bilirubin 11/21/2020 1.3* 0.3 - 1.2 mg/dL Final    Alk Phosphatase 11/21/2020 57  39 - 130 U/L Final    AST 11/21/2020 27  0 - 41 U/L Final    ALT 11/21/2020 20  0 - 40 U/L Final    Total Protein 11/21/2020 7.2  6.0 - 8.0 g/dL Final    Albumin 11/21/2020 4.4  3.2 - 5.3 g/dL Final    Comment:             Test performed at [facility]. [facility]                  [address], [facility]                                         CLIA Number [id number]  ---------------------------------------------------------------------      TSH 11/21/2020 0.60  0.49 - 4.67 uIU/mL Final    Comment: Performed at [facility]. [facility]  [address] [facility] [address]  Pathology [address], [place], [address] [facility]  CLIA No. [id number]   CAP Accreditation No. [id number]  : Zeinab Pryor. Annamarie Leon M.D.     [facility] Cholesterol 11/21/2020 152  150 - 200 mg/dL Final    Triglycerides 11/21/2020 159* 27 - 150 mg/dL Final    HDL 11/21/2020 33* >39 mg/dL Final    Comment:       HDL <40 mg/dL - High Risk  HDL > or = 40mg/dL- Desirable  HDL >60 mg/dL - Negative Risk  ---------------------------------------------      LDL Calculated 11/21/2020 87  <130 mg/dL Final    Comment:       LDL    <100 mg/dL - Desirable  LDL    >160 mg/dL - High Risk  ---------------------------------------------      VLDL Cholesterol Calculated 11/21/2020 32* 0 - 30 mg/dL Final    LDl/HDL Ratio 11/21/2020 2.6  <3.5 Final    Chol/HDL Ratio 11/21/2020 4.6  1.0 - 5.0 Final    Comment:             Test performed at [facility]. [facility]                  [address], [facility]                                         CLIA Number [id number]  ---------------------------------------------------------------------         No results found for this visit on 05/18/21. Assessment/Plan:     Shadia Vargas was seen today for memory loss. Diagnoses and all orders for this visit:    Transient alteration of awareness  -     MRI BRAIN WO CONTRAST; Future  -     ALT; Future  -     AST; Future  -     CBC Auto Differential; Future  -     Hemoglobin A1C; Future  -     Magnesium; Future  -     Renal Function Panel; Future  -     TSH with Reflex; Future  -     Urinalysis with Microscopic; Future  -     Vitamin B12 & Folate; Future  -     Vitamin D 25 Hydroxy;  Future  -     [facility] - Aleah Summers MD, Neurology, [place]    Medication refill  -     rivaroxaban Usha Diallo) 20 MG TABS tablet; TAKE 1 TABLET BY MOUTH EVERY DAY    Hyperglycemia  -     Hemoglobin A1C; Future    Vitamin D deficiency   -     Vitamin D 25 Hydroxy; Future    Follow up on labs and imaging. R/O reversible causes of memory loss as above. Recommended that he not drive. Encouraged he make appt with neurology as directed. Recommended he f/u w/ DD in one month. Refill provided. All questions answered and addressed to patient satisfaction. Patient understands and agrees to the plan. The patient was evaluated and treated today based on the osteopathic principle that each person is a unit of body, mind, and spirit, the body is capable of self-regulation, self-healing, and health maintenance and that structure and function are reciprocally interrelated. Follow-up:   Return in about 1 month (around 6/18/2021) for w/dd.       Esteban Null D.O.

## 2021-05-19 LAB
FOLATE: 13.8 NG/ML
VITAMIN B-12: 373 PG/ML (ref 232–1245)

## 2021-05-27 ENCOUNTER — HOSPITAL ENCOUNTER (OUTPATIENT)
Dept: MRI IMAGING | Facility: CLINIC | Age: 76
Discharge: HOME | End: 2021-05-29
Payer: MEDICARE

## 2021-05-27 DIAGNOSIS — R40.4 TRANSIENT ALTERATION OF AWARENESS: ICD-10-CM

## 2021-05-27 DIAGNOSIS — G31.9 CEREBRAL ATROPHY (HCC): Primary | ICD-10-CM

## 2021-05-27 PROCEDURE — 70551 MRI BRAIN STEM W/O DYE: CPT

## 2021-06-03 DIAGNOSIS — Z76.0 MEDICATION REFILL: ICD-10-CM

## 2021-06-30 ENCOUNTER — OFFICE VISIT (OUTPATIENT)
Dept: FAMILY MEDICINE CLINIC | Age: 76
End: 2021-06-30
Payer: MEDICARE

## 2021-06-30 VITALS
OXYGEN SATURATION: 96 % | DIASTOLIC BLOOD PRESSURE: 80 MMHG | BODY MASS INDEX: 32.53 KG/M2 | HEART RATE: 80 BPM | SYSTOLIC BLOOD PRESSURE: 120 MMHG | WEIGHT: 189.5 LBS

## 2021-06-30 DIAGNOSIS — C61 MALIGNANT TUMOR OF PROSTATE (HCC): ICD-10-CM

## 2021-06-30 DIAGNOSIS — R41.3 MEMORY CHANGE: Primary | ICD-10-CM

## 2021-06-30 PROCEDURE — 1123F ACP DISCUSS/DSCN MKR DOCD: CPT | Performed by: FAMILY MEDICINE

## 2021-06-30 PROCEDURE — G8427 DOCREV CUR MEDS BY ELIG CLIN: HCPCS | Performed by: FAMILY MEDICINE

## 2021-06-30 PROCEDURE — 1036F TOBACCO NON-USER: CPT | Performed by: FAMILY MEDICINE

## 2021-06-30 PROCEDURE — 99214 OFFICE O/P EST MOD 30 MIN: CPT | Performed by: FAMILY MEDICINE

## 2021-06-30 PROCEDURE — 3017F COLORECTAL CA SCREEN DOC REV: CPT | Performed by: FAMILY MEDICINE

## 2021-06-30 PROCEDURE — 4040F PNEUMOC VAC/ADMIN/RCVD: CPT | Performed by: FAMILY MEDICINE

## 2021-06-30 PROCEDURE — G8417 CALC BMI ABV UP PARAM F/U: HCPCS | Performed by: FAMILY MEDICINE

## 2021-06-30 ASSESSMENT — PATIENT HEALTH QUESTIONNAIRE - PHQ9
SUM OF ALL RESPONSES TO PHQ9 QUESTIONS 1 & 2: 1
SUM OF ALL RESPONSES TO PHQ QUESTIONS 1-9: 1
2. FEELING DOWN, DEPRESSED OR HOPELESS: 1
1. LITTLE INTEREST OR PLEASURE IN DOING THINGS: 0
SUM OF ALL RESPONSES TO PHQ QUESTIONS 1-9: 1
SUM OF ALL RESPONSES TO PHQ QUESTIONS 1-9: 1

## 2021-06-30 NOTE — PROGRESS NOTES
lymphadenopathy. No JVD noted. Carotids are clear bilaterally. No thyroid masses noted. Heart: RRR without murmur. No S3, S4, or gallop noted. Chest: Clear to auscultation bilaterally. Good breath sounds noted. No rales, wheezes, or rhonchi noted. No respiratory retractions noted. Wall has symmetrical movement with respirations. Assessment:   Encounter Diagnoses   Name Primary?  Memory change Yes    Malignant tumor of prostate ([facility]          Plan:   There are no discontinued medications. THE ABOVE NOTED DISCONTINUED MEDS MAY ONLY BE FROM 'CLEANING UP' THE MED LIST AND WERE NOT ACTUALLY CANCELED;  SEE CHART FOR DETAILS! No orders of the defined types were placed in this encounter. No orders of the defined types were placed in this encounter. No follow-ups on file. There are no Patient Instructions on file for this visit. Data Unavailable        Fu with neuro next week    Advised patient to stay socially, physically and cognitively active.     told him that I see no point in prevagen

## 2021-07-07 ENCOUNTER — OFFICE VISIT (OUTPATIENT)
Dept: NEUROLOGY | Age: 76
End: 2021-07-07
Payer: MEDICARE

## 2021-07-07 VITALS — DIASTOLIC BLOOD PRESSURE: 90 MMHG | SYSTOLIC BLOOD PRESSURE: 148 MMHG | HEART RATE: 69 BPM

## 2021-07-07 DIAGNOSIS — R41.3 MEMORY LOSS: Primary | ICD-10-CM

## 2021-07-07 PROCEDURE — G8427 DOCREV CUR MEDS BY ELIG CLIN: HCPCS | Performed by: PSYCHIATRY & NEUROLOGY

## 2021-07-07 PROCEDURE — 3017F COLORECTAL CA SCREEN DOC REV: CPT | Performed by: PSYCHIATRY & NEUROLOGY

## 2021-07-07 PROCEDURE — G8417 CALC BMI ABV UP PARAM F/U: HCPCS | Performed by: PSYCHIATRY & NEUROLOGY

## 2021-07-07 PROCEDURE — 99204 OFFICE O/P NEW MOD 45 MIN: CPT | Performed by: PSYCHIATRY & NEUROLOGY

## 2021-07-07 PROCEDURE — 1123F ACP DISCUSS/DSCN MKR DOCD: CPT | Performed by: PSYCHIATRY & NEUROLOGY

## 2021-07-07 PROCEDURE — 4040F PNEUMOC VAC/ADMIN/RCVD: CPT | Performed by: PSYCHIATRY & NEUROLOGY

## 2021-07-07 PROCEDURE — 1036F TOBACCO NON-USER: CPT | Performed by: PSYCHIATRY & NEUROLOGY

## 2021-07-07 RX ORDER — ACETAMINOPHEN 325 MG/1
650 TABLET ORAL EVERY 6 HOURS PRN
COMMUNITY

## 2021-07-07 ASSESSMENT — ENCOUNTER SYMPTOMS
ALLERGIC/IMMUNOLOGIC NEGATIVE: 1
RESPIRATORY NEGATIVE: 1
GASTROINTESTINAL NEGATIVE: 1
EYES NEGATIVE: 1

## 2021-09-07 DIAGNOSIS — Z76.0 MEDICATION REFILL: ICD-10-CM

## 2021-12-19 DIAGNOSIS — Z76.0 MEDICATION REFILL: ICD-10-CM

## 2021-12-19 NOTE — TELEPHONE ENCOUNTER
Nisa Aguilar is calling to request a refill on the following medication(s):    Medication Request:  Requested Prescriptions     Pending Prescriptions Disp Refills    rivaroxaban (XARELTO) 20 MG TABS tablet [Pharmacy Med Name: XARELTO 20MG TABLETS] 90 tablet 0     Sig: TAKE 1 TABLET BY MOUTH EVERY DAY       Last Visit Date (If Applicable):  4/43/7617    Next Visit Date:    Visit date not found

## 2021-12-27 ENCOUNTER — TELEPHONE (OUTPATIENT)
Dept: FAMILY MEDICINE CLINIC | Age: 76
End: 2021-12-27

## 2021-12-27 NOTE — TELEPHONE ENCOUNTER
Patient daughter is calling about there fall incident and upcoming appointment with you 1/13/22. .. she stated the hospital took him off his Xarelto and she was wondering if he should start it back now or wait to see you?

## 2021-12-28 ENCOUNTER — OFFICE VISIT (OUTPATIENT)
Dept: NEUROLOGY | Age: 76
End: 2021-12-28
Payer: MEDICARE

## 2021-12-28 VITALS
SYSTOLIC BLOOD PRESSURE: 149 MMHG | HEART RATE: 75 BPM | DIASTOLIC BLOOD PRESSURE: 91 MMHG | TEMPERATURE: 97.2 F | BODY MASS INDEX: 33.29 KG/M2 | HEIGHT: 64 IN | WEIGHT: 195 LBS

## 2021-12-28 DIAGNOSIS — R41.3 MEMORY LOSS: ICD-10-CM

## 2021-12-28 DIAGNOSIS — R26.81 GAIT INSTABILITY: Primary | ICD-10-CM

## 2021-12-28 PROCEDURE — 3017F COLORECTAL CA SCREEN DOC REV: CPT | Performed by: PSYCHIATRY & NEUROLOGY

## 2021-12-28 PROCEDURE — 4040F PNEUMOC VAC/ADMIN/RCVD: CPT | Performed by: PSYCHIATRY & NEUROLOGY

## 2021-12-28 PROCEDURE — 99214 OFFICE O/P EST MOD 30 MIN: CPT | Performed by: PSYCHIATRY & NEUROLOGY

## 2021-12-28 PROCEDURE — 1123F ACP DISCUSS/DSCN MKR DOCD: CPT | Performed by: PSYCHIATRY & NEUROLOGY

## 2021-12-28 PROCEDURE — G8484 FLU IMMUNIZE NO ADMIN: HCPCS | Performed by: PSYCHIATRY & NEUROLOGY

## 2021-12-28 PROCEDURE — 1036F TOBACCO NON-USER: CPT | Performed by: PSYCHIATRY & NEUROLOGY

## 2021-12-28 PROCEDURE — G8427 DOCREV CUR MEDS BY ELIG CLIN: HCPCS | Performed by: PSYCHIATRY & NEUROLOGY

## 2021-12-28 PROCEDURE — G8417 CALC BMI ABV UP PARAM F/U: HCPCS | Performed by: PSYCHIATRY & NEUROLOGY

## 2021-12-28 RX ORDER — ROSUVASTATIN CALCIUM 5 MG/1
TABLET, COATED ORAL
COMMUNITY
Start: 2021-12-22

## 2021-12-28 ASSESSMENT — ENCOUNTER SYMPTOMS
GASTROINTESTINAL NEGATIVE: 1
EYES NEGATIVE: 1
ALLERGIC/IMMUNOLOGIC NEGATIVE: 1
RESPIRATORY NEGATIVE: 1

## 2021-12-28 NOTE — PROGRESS NOTES
Active problem memory complaints felt to be within normal variant . The condition is he had a fall aweek ago walking around neighborhood for 1 1/2 miles almost making it home developing imbalance with tendency to go forward being lightheaded . He fell forward hitting head with left retrorbital contusion . He was taken to [facility] carotid US < 50 % left ICA . Normal right ICA . Cardiac 2 D echo LV EF 55-60 % . Mild ton moderate AR and MR . CT of Head and face left periorbital hematoma. He underwent cardiac catherization showing mild CAD  . He has been resumed xarelto 20 mg po qd for which he was taking for pulmnary embolus . There was another fall winter of last year loosing balance going forward going into [place] . There is occasional ligheadednes. There is history of low back pain in mid intermittent in right low back area grade 5 over 10 nonradiating . His ex wife reports that he [place] get lost on occassion when driving . He is able to remember conversation. He lives alone doing checkbook and processing his finances well  . He will do cleaning with him heating food in microwave . He is retired superintendant fo construction . There is occasional low mood . He will drink one beer per week  . He reports mild postural instabilty on his feet having had one fall this past winter falling in snow . Testing MRI of Head with bilateral chronic periventricular small vessel ischemia . F28 682 , folic acid 52.8 , TSH normal , Carotid US < 50 % left ICA . Normal right ICA , Cardiac 2 D echo LV EF 55-60 % . Mild to moderate AR and MR . CT of Head and face left periorbital hematoma      Past Medical History:   Diagnosis Date    Arthritis     Hand, bilateral and Posterior Neck    Cancer ([address] 2008    Prostate, radiation seeds implanted.  Foot fracture, right     GERD (gastroesophageal reflux disease)     Tums takes it away per pt.     Hx of blood clots 2012    Bilateral Lung, Left Leg    Pulmonary embolism ([address] 2012    Shoulder fracture, right     Wears glasses        Past Surgical History:   Procedure Laterality Date    COLONOSCOPY      HERNIA REPAIR Right     Hernia at 10yrs old approx. and 1994 Hernia repair. Unknown type per pt.  INGUINAL HERNIA REPAIR Right 07/24/2018    [facility]    LUNG SURGERY Left 2006    lung biopsy    SD REPAIR ING HERNIA,5+Y/O,REDUCIBL Right 7/24/2018    RECURRENT HERNIA INGUINAL REPAIR RIGHT WITH MESH performed by Alix Nunez MD at [facility]       History reviewed. No pertinent family history. Social History     Socioeconomic History    Marital status:      Spouse name: None    Number of children: 2    Years of education: 12    Highest education level: None   Occupational History    Occupation: retired   Tobacco Use    Smoking status: Never Smoker    Smokeless tobacco: Never Used   Vaping Use    Vaping Use: Never used   Substance and Sexual Activity    Alcohol use: Yes     Comment: Occasion    Drug use: No    Sexual activity: Yes     Partners: Female   Other Topics Concern    None   Social History Narrative    Diet - average    Caffeine intake per day - none    Exercise pattern - walk    Living situation - house, alone    How would you describe yourself as a ?good    Any MVA's in the past 2 years that you were at fault for?no    Any tickets in the past 2 years?no    How often do you wear a seatbelt? always     Social Determinants of Health     Financial Resource Strain:     Difficulty of Paying Living Expenses: Not on file   Food Insecurity:     Worried About Running Out of Food in the Last Year: Not on file    Dori of Food in the Last Year: Not on file   Transportation Needs:     Lack of Transportation (Medical): Not on file    Lack of Transportation (Non-Medical):  Not on file   Physical Activity:     Days of Exercise per Week: Not on file    Minutes of Exercise per Session: Not on file   Stress:     Feeling of Stress : Not on file   Social Negative. Neurological Examination  Constitutional .General exam well groomed   Head/Ears /Nose/Throat: external ear . Normal exam  Neck and thyroid . Normal size. No bruits  Respiratory . Breathsounds clear bilaterally  Cardiovascular: Auscultation of heart with regular rate and rhythm  Musculoskeletal. Muscle bulk and tone normal                                                           Muscle strength 5/5 strength throughout                                                                                No dysmetria or dysdiadokinesis  No tremor   Normal fine motor  Gait normal   Orientation Alert and oriented x 3 . [place] . World able to spell forwards and backwards . Serial 7 to 72   Attention and concentration normal  Short term memory 3 words out of 3 in one minute   Language process and speech normal . No aphasia   Cranial nerve 2 normal acuety and visual fields  Cranial nerve 3, 4 and 6 . Extraocular muscles are intact . Pupils are equal and reactive   Cranial nerve 5. Intact corneal reflex. Normal facial sensation  Cranial nerve 7 normal exam   Cranial nerve 8. Grossly intact hearing   Cranial nerve 9 and 10. Symmetric palate elevation   Cranial nerve 11 , 5 out of 5 strength   Cranial Nerve 12 midline tongue . No atrophy  Sensation . Normal pinprick and light touch   Deep Tendon Reflexes normal  Plantar response flexor bilaterally      ASSESSMENT/PLAN      Diagnosis Orders   1. Gait instability     2.  Memory loss     Feel he has age related instability to undergo Yoga for balance and conditioning program       As above

## 2021-12-30 NOTE — TELEPHONE ENCOUNTER
Spoke with patient.  Patient stated that he will call his cardiologist and find out since the cardiologist at the hospital is the one who stopped it

## 2022-01-10 ENCOUNTER — TELEPHONE (OUTPATIENT)
Dept: FAMILY MEDICINE CLINIC | Age: 77
End: 2022-01-10

## 2022-01-10 DIAGNOSIS — Z76.0 MEDICATION REFILL: ICD-10-CM

## 2022-01-10 NOTE — TELEPHONE ENCOUNTER
Iqra Huitron is calling to request a refill on the following medication(s):    Medication Request:  Requested Prescriptions     Pending Prescriptions Disp Refills    rivaroxaban (XARELTO) 20 MG TABS tablet 90 tablet 0     Sig: TAKE 1 TABLET BY MOUTH EVERY DAY       Last Visit Date (If Applicable):  3/72/0137    Next Visit Date:    Visit date not found V-Y Plasty Text: The defect edges were debeveled with a #15 scalpel blade.  Given the location of the defect, shape of the defect and the proximity to free margins an V-Y advancement flap was deemed most appropriate.  Using a sterile surgical marker, an appropriate advancement flap was drawn incorporating the defect and placing the expected incisions within the relaxed skin tension lines where possible.    The area thus outlined was incised deep to adipose tissue with a #15 scalpel blade.  The skin margins were undermined to an appropriate distance in all directions utilizing iris scissors.

## 2022-01-10 NOTE — TELEPHONE ENCOUNTER
----- Message from [place] sent at 1/10/2022  9:10 AM EST -----  Subject: Refill Request    QUESTIONS  Name of Medication? rivaroxaban (XARELTO) 20 MG TABS tablet  Patient-reported dosage and instructions? 20 MG Once daily  How many days do you have left? 2  [id number]  Pharmacy phone number (if available)? [phone]  Additional Information for Provider? He wants a 3 month supply. ---------------------------------------------------------------------------  --------------  Queenie RANKIN  What is the best way for the office to contact you? OK to leave message on   voicemail  Preferred Call Back Phone Number?  [phone]

## 2022-01-13 ENCOUNTER — OFFICE VISIT (OUTPATIENT)
Dept: FAMILY MEDICINE CLINIC | Age: 77
End: 2022-01-13
Payer: MEDICARE

## 2022-01-13 VITALS
OXYGEN SATURATION: 96 % | DIASTOLIC BLOOD PRESSURE: 82 MMHG | HEART RATE: 66 BPM | WEIGHT: 195 LBS | TEMPERATURE: 98.2 F | BODY MASS INDEX: 33.47 KG/M2 | SYSTOLIC BLOOD PRESSURE: 132 MMHG

## 2022-01-13 DIAGNOSIS — I10 PRIMARY HYPERTENSION: ICD-10-CM

## 2022-01-13 DIAGNOSIS — Z09 HOSPITAL DISCHARGE FOLLOW-UP: Primary | ICD-10-CM
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
## 2022-01-13 PROCEDURE — 4040F PNEUMOC VAC/ADMIN/RCVD: CPT | Performed by: NURSE PRACTITIONER

## 2022-01-13 PROCEDURE — 1123F ACP DISCUSS/DSCN MKR DOCD: CPT | Performed by: NURSE PRACTITIONER

## 2022-01-13 PROCEDURE — 1111F DSCHRG MED/CURRENT MED MERGE: CPT | Performed by: NURSE PRACTITIONER

## 2022-01-13 PROCEDURE — 99214 OFFICE O/P EST MOD 30 MIN: CPT | Performed by: NURSE PRACTITIONER

## 2022-01-13 PROCEDURE — G8484 FLU IMMUNIZE NO ADMIN: HCPCS | Performed by: NURSE PRACTITIONER

## 2022-01-13 PROCEDURE — G8427 DOCREV CUR MEDS BY ELIG CLIN: HCPCS | Performed by: NURSE PRACTITIONER

## 2022-01-13 PROCEDURE — 1036F TOBACCO NON-USER: CPT | Performed by: NURSE PRACTITIONER

## 2022-01-13 PROCEDURE — G8417 CALC BMI ABV UP PARAM F/U: HCPCS | Performed by: NURSE PRACTITIONER

## 2022-01-13 PROCEDURE — 3288F FALL RISK ASSESSMENT DOCD: CPT | Performed by: NURSE PRACTITIONER

## 2022-01-13 PROCEDURE — 3017F COLORECTAL CA SCREEN DOC REV: CPT | Performed by: NURSE PRACTITIONER

## 2022-01-13 RX ORDER — LISINOPRIL 10 MG/1
10 TABLET ORAL DAILY
Qty: 90 TABLET | Refills: 1 | Status: SHIPPED | OUTPATIENT
Start: 2022-01-13 | End: 2022-02-02 | Stop reason: SDUPTHER

## 2022-01-13 RX ORDER — LISINOPRIL 10 MG/1
10 TABLET ORAL DAILY
Qty: 30 TABLET | Refills: 5 | Status: SHIPPED | OUTPATIENT
Start: 2022-01-13 | End: 2022-01-13

## 2022-01-13 SDOH — ECONOMIC STABILITY: FOOD INSECURITY: WITHIN THE PAST 12 MONTHS, YOU WORRIED THAT YOUR FOOD WOULD RUN OUT BEFORE YOU GOT MONEY TO BUY MORE.: NEVER TRUE

## 2022-01-13 SDOH — ECONOMIC STABILITY: FOOD INSECURITY: WITHIN THE PAST 12 MONTHS, THE FOOD YOU BOUGHT JUST DIDN'T LAST AND YOU DIDN'T HAVE MONEY TO GET MORE.: NEVER TRUE

## 2022-01-13 ASSESSMENT — SOCIAL DETERMINANTS OF HEALTH (SDOH): HOW HARD IS IT FOR YOU TO PAY FOR THE VERY BASICS LIKE FOOD, HOUSING, MEDICAL CARE, AND HEATING?: NOT HARD AT ALL

## 2022-01-13 NOTE — PROGRESS NOTES
Post-Discharge Transitional Care Management Services or Hospital Follow Up      Jerica Lees   YOB: 1945    Date of Office Visit:  1/13/2022  Date of Hospital Admission: 1/1/2022  Date of Hospital Discharge: 1/3/2022    Care management risk score Rising risk (score 2-5) and Complex Care (Scores >=6): 0     Non face to face  following discharge, date last encounter closed (first attempt may have been earlier): *No documented post hospital discharge outreach found in the last 14 days     Call initiated 2 business days of discharge: *No response recorded in the last 14 days    Patient Active Problem List   Diagnosis    History of shingles    History of prostate cancer    History of pulmonary embolism    Impotence of organic origin    Malignant tumor of prostate [address] [address]       No Known Allergies    Medications listed as ordered at the time of discharge from hospital  Yes    Medications marked \"taking\" at this time  Outpatient Medications Marked as Taking for the 1/13/22 encounter (Office Visit) with NO Rodriguez CNP   Medication Sig Dispense Refill    lisinopril (PRINIVIL;ZESTRIL) 10 MG tablet Take 1 tablet by mouth daily 30 tablet 5    rivaroxaban (XARELTO) 20 MG TABS tablet TAKE 1 TABLET BY MOUTH EVERY DAY 90 tablet 0    rosuvastatin (CRESTOR) 5 MG tablet TAKE 1 TABLET BY MOUTH DAILY      acetaminophen (TYLENOL) 325 MG tablet Take 650 mg by mouth every 6 hours as needed for Pain      sildenafil (REVATIO) 20 MG tablet sildenafil (pulmonary hypertension) 20 mg tablet   Take 1 tablet 3 times a day by oral route.  Apoaequorin (PREVAGEN) 10 MG CAPS Take by mouth           Medications patient taking as of now reconciled against medications ordered at time of hospital discharge: Yes    Chief Complaint   Patient presents with   [address] from [facility] 2 weeks ago fell and cut eyebrow, htn       HPI    Inpatient course: Discharge summary reviewed- see chart.     Interval history/Current status:     Patient reports on 1/1 he fell and cut his eyebrow above left eye. He went to ER at [facility] and was admitted for 2 days. Says that prior to falling he felt dizzy. He says that his blood pressure was elevated when he was at the hospital.     He has been keeping track of bp at home and averaging 140-160/80's. Denies chest pain, shortness of breath, headaches or swelling. Vitals:    01/13/22 1130 01/13/22 1146   BP: 112/62 132/82   Site: Left Upper Arm Left Upper Arm   Position: Sitting Sitting   Cuff Size: Medium Adult    Pulse: 66    Temp: 98.2 °F (36.8 °C)    TempSrc: Temporal    SpO2: 96%    Weight: 195 lb (88.5 kg)      Body mass index is 33.47 kg/m². Wt Readings from Last 3 Encounters:   01/13/22 195 lb (88.5 kg)   12/28/21 195 lb (88.5 kg)   06/30/21 189 lb 8 oz (86 kg)     BP Readings from Last 3 Encounters:   01/13/22 132/82   12/28/21 (!) 149/91   07/07/21 (!) 148/90       Review of Systems   Neurological: Positive for dizziness and light-headedness. All other systems reviewed and are negative. Physical Exam  Constitutional:       Appearance: He is normal weight. HENT:      Head: Normocephalic and atraumatic. Eyes:      Pupils: Pupils are equal, round, and reactive to light. Cardiovascular:      Rate and Rhythm: Normal rate and regular rhythm. Pulses: Normal pulses. Heart sounds: Normal heart sounds. Pulmonary:      Effort: Pulmonary effort is normal.      Breath sounds: Normal breath sounds. Musculoskeletal:         General: Normal range of motion. Cervical back: Normal range of motion and neck supple. Skin:     General: Skin is warm and dry. Neurological:      General: No focal deficit present. Mental Status: He is alert and oriented to person, place, and time. Mental status is at baseline. Psychiatric:         Mood and Affect: Mood normal.         Behavior: Behavior normal.         Thought Content:  Thought content normal. Judgment: Judgment normal.               Assessment/Plan:  1. Hospital discharge follow-up    - MO DISCHARGE MEDS RECONCILED W/ CURRENT OUTPATIENT MED LIST    2. Fall, subsequent encounter    - Continue to follow with Neurology, may need second opinion and possible cardiology consult. - MO DISCHARGE MEDS RECONCILED W/ CURRENT OUTPATIENT MED LIST    3. Primary hypertension    - Will add lisinopril  - Recommend low salt and caffeine  - Check bp daily and bring log to next appt  - lisinopril (PRINIVIL;ZESTRIL) 10 MG tablet; Take 1 tablet by mouth daily  Dispense: 30 tablet;  Refill: 5        Medical Decision Making: moderate complexity        Electronically Signed by: NO Paulson-CNP

## 2022-01-13 NOTE — TELEPHONE ENCOUNTER
Elvia Solares is calling to request a refill on the following medication(s):    Medication Request:  Requested Prescriptions     Pending Prescriptions Disp Refills    lisinopril (PRINIVIL;ZESTRIL) 10 MG tablet [Pharmacy Med Name: LISINOPRIL 10MG TABLETS] 90 tablet      Sig: TAKE 1 TABLET BY MOUTH DAILY       Last Visit Date (If Applicable):  0/45/2479    Next Visit Date:    2/2/2022

## 2022-02-02 ENCOUNTER — OFFICE VISIT (OUTPATIENT)
Dept: FAMILY MEDICINE CLINIC | Age: 77
End: 2022-02-02
Payer: MEDICARE

## 2022-02-02 VITALS
OXYGEN SATURATION: 95 % | BODY MASS INDEX: 33.12 KG/M2 | TEMPERATURE: 97.5 F | HEART RATE: 65 BPM | DIASTOLIC BLOOD PRESSURE: 74 MMHG | HEIGHT: 64 IN | WEIGHT: 194 LBS | SYSTOLIC BLOOD PRESSURE: 118 MMHG | RESPIRATION RATE: 14 BRPM

## 2022-02-02 DIAGNOSIS — N52.9 IMPOTENCE OF ORGANIC ORIGIN: ICD-10-CM

## 2022-02-02 DIAGNOSIS — Z76.89 ENCOUNTER TO ESTABLISH CARE WITH NEW DOCTOR: ICD-10-CM

## 2022-02-02 DIAGNOSIS — C61 MALIGNANT TUMOR OF PROSTATE (HCC): ICD-10-CM

## 2022-02-02 DIAGNOSIS — I10 PRIMARY HYPERTENSION: Primary | ICD-10-CM

## 2022-02-02 DIAGNOSIS — E78.2 MIXED HYPERLIPIDEMIA: ICD-10-CM

## 2022-02-02 PROCEDURE — 3017F COLORECTAL CA SCREEN DOC REV: CPT | Performed by: NURSE PRACTITIONER

## 2022-02-02 PROCEDURE — 1123F ACP DISCUSS/DSCN MKR DOCD: CPT | Performed by: NURSE PRACTITIONER

## 2022-02-02 PROCEDURE — G8417 CALC BMI ABV UP PARAM F/U: HCPCS | Performed by: NURSE PRACTITIONER

## 2022-02-02 PROCEDURE — 4040F PNEUMOC VAC/ADMIN/RCVD: CPT | Performed by: NURSE PRACTITIONER

## 2022-02-02 PROCEDURE — 99214 OFFICE O/P EST MOD 30 MIN: CPT | Performed by: NURSE PRACTITIONER

## 2022-02-02 PROCEDURE — 1036F TOBACCO NON-USER: CPT | Performed by: NURSE PRACTITIONER

## 2022-02-02 PROCEDURE — G8484 FLU IMMUNIZE NO ADMIN: HCPCS | Performed by: NURSE PRACTITIONER

## 2022-02-02 PROCEDURE — G8427 DOCREV CUR MEDS BY ELIG CLIN: HCPCS | Performed by: NURSE PRACTITIONER

## 2022-02-02 RX ORDER — LISINOPRIL 10 MG/1
10 TABLET ORAL DAILY
Qty: 90 TABLET | Refills: 1 | Status: SHIPPED | OUTPATIENT
Start: 2022-02-02

## 2022-02-02 NOTE — PROGRESS NOTES
[address]i [address]  1963 [address] [facility]. Renee Ghosh [address]  [phone]  [phone]    Srikanth Lopez is a 78 y.o. male who is here with c/o of:    Chief Complaint: Established New Doctor      Patient Accompanied by: n/a    HPI - Srikanth Lopez is here today with c/o:    Patient here to establish care. Previous PCP: Dr Justo Arellano  : No  Children: Yes  Employed: Retired  Exercise: Yes- walk and bike  Diet: Eats a balanced diet  Smoker: No  Alcohol: Socially  Sleep: Averages 6-8 hours    Chronic Conditions:    HTN  Hyperlipidemia  Impotence     Specialists:    Urology    Health Concerns:    None    Health Maintenance Due   Topic Date Due    PSA counseling  09/11/2015    Annual Wellness Visit (AWV)  11/18/2021    Lipid screen  11/21/2021        Patient Active Problem List:     History of shingles     History of prostate cancer     History of pulmonary embolism     Impotence of organic origin     Malignant tumor of prostate [facility])     Past Medical History:   Diagnosis Date    Arthritis     Hand, bilateral and Posterior Neck    Cancer ([address] 2008    Prostate, radiation seeds implanted.  Foot fracture, right     GERD (gastroesophageal reflux disease)     Tums takes it away per pt.  Hx of blood clots 2012    Bilateral Lung, Left Leg    Pulmonary embolism ([address] 2012    Shoulder fracture, right     Wears glasses       Past Surgical History:   Procedure Laterality Date    COLONOSCOPY      HERNIA REPAIR Right     Hernia at 10yrs old approx. and 1994 Hernia repair. Unknown type per pt.     INGUINAL HERNIA REPAIR Right 07/24/2018    [facility]    LUNG SURGERY Left 2006    lung biopsy    [facility] REPAIR ING HERNIA,5+Y/O,REDUCIBL Right 7/24/2018    RECURRENT HERNIA INGUINAL REPAIR RIGHT WITH MESH performed by Demetrius Singh MD at [address] [place] [address] History   Problem Relation Age of Onset    Heart Attack Mother     Cancer Father         lung ca     Social History Tobacco Use    Smoking status: Never Smoker    Smokeless tobacco: Never Used   Substance Use Topics    Alcohol use: Yes     Comment: Occasion     ALLERGIES:  No Known Allergies       Subjective   Review of Systems   · Constitutional:  Negative for activity change, appetite change,unexpected weight change, chills, fever, and fatigue. · HENT: Negative for ear pain, sore throat,  Rhinorrhea, sinus pain, sinus pressure, congestion. · Eyes:  Negative for pain and discharge. · Respiratory:  Negative for chest tightness, shortness of breath, wheezing, and cough. · Cardiovascular:  Negative for chest pain, palpitations and leg swelling. · Gastrointestinal: Negative for abdominal pain, blood in stool, constipation,diarrhea, nausea and vomiting. · Endocrine: Negative for cold intolerance, heat intolerance, polydipsia, polyphagia and polyuria. · Genitourinary: Negative for difficulty urinating, dysuria, flank pain, frequency, hematuria and urgency. · Musculoskeletal: Negative for arthralgias, back pain, joint swelling, myalgias, neck pain and neck stiffness. · Skin: Negative for rash and wound. · Allergic/Immunologic: Negative for environmental allergies and food allergies. · Neurological:  Negative for dizziness, light-headedness, numbness and headaches. · Hematological:  Negative for adenopathy. Does not bruise/bleed easily. · Psychiatric/Behavioral: Negative for self-injury, sleep disturbance and suicidal ideas. Objective   Physical Exam   PHYSICAL EXAM:   · Constitutional: Crow Acuna is oriented to person, place, and time. Vital signs are normal. Appears well-developed and well-nourished. · Eyes:PERRL, EOMI, Conjunctiva normal, No discharge. · Neck: Full passive range of motion. Non-tender on palpation. Neck supple. No thyromegaly present. Trachea normal.  · Cardiovascular: Normal rate, regular rhythm, S1, S2, no murmur, no gallop, no friction rub, intact distal pulses. · Pulmonary/Chest: Breath sounds are clear throughout, No respiratory distress, No wheezing, No chest tenderness. Effort normal  · Abdominal: Soft. Normal appearance, bowel sounds are present and normoactive. There is no hepatosplenomegaly. There is no tenderness. There is no CVA tenderness. · Musculoskeletal: Extremities appear regular and symmetric. No evident masses, lesions, foreign bodies, or other abnormalities. No edema. No tenderness on palpation. Joints are stable. Full ROM, strength and tone are within normal limits. · Neurological: Alert and oriented to person, place, and time. Normal motor function, Normal sensory function, No focal deficits noted. He has normal strength. · Skin: Skin is warm, dry and intact. No obvious lesions on exposed skin  · Psychiatric: Normal mood and affect. Speech is normal and behavior is normal.     Nursing note and vitals reviewed. Blood pressure 118/74, pulse 65, temperature 97.5 °F (36.4 °C), temperature source Temporal, resp. rate 14, height 5' 4\" (1.626 m), weight 194 lb (88 kg), SpO2 95 %. Body mass index is 33.3 kg/m².     Wt Readings from Last 3 Encounters:   02/02/22 194 lb (88 kg)   01/13/22 195 lb (88.5 kg)   12/28/21 195 lb (88.5 kg)     BP Readings from Last 3 Encounters:   02/02/22 118/74   01/13/22 132/82   12/28/21 (!) 149/91       Hospital Outpatient Visit on 05/18/2021   Component Date Value Ref Range Status    ALT 05/18/2021 19  5 - 41 U/L Final    AST 05/18/2021 27  <40 U/L Final    WBC 05/18/2021 10.8  3.5 - 11.3 k/uL Final    RBC 05/18/2021 5.66  4.21 - 5.77 m/uL Final    Hemoglobin 05/18/2021 15.9  13.0 - 17.0 g/dL Final    Hematocrit 05/18/2021 51.5* 40.7 - 50.3 % Final    MCV 05/18/2021 91.0  82.6 - 102.9 fL Final    MCH 05/18/2021 28.1  25.2 - 33.5 pg Final    MCHC 05/18/2021 30.9  28.4 - 34.8 g/dL Final    RDW 05/18/2021 14.2  11.8 - 14.4 % Final    Platelets 51/89/9207 225  138 - 453 k/uL Final    MPV 05/18/2021 12.3  8.1 - 13.5 fL Final    NRBC Automated 05/18/2021 0.0  0.0 per 100 WBC Final    Differential Type 05/18/2021 NOT REPORTED   Final    Seg Neutrophils 05/18/2021 58  36 - 65 % Final    Lymphocytes 05/18/2021 25  24 - 43 % Final    Monocytes 05/18/2021 13* 3 - 12 % Final    Eosinophils % 05/18/2021 2  1 - 4 % Final    Basophils 05/18/2021 1  0 - 2 % Final    Immature Granulocytes 05/18/2021 1* 0 % Final    Segs Absolute 05/18/2021 6.38  1.50 - 8.10 k/uL Final    Absolute Lymph # 05/18/2021 2.65  1.10 - 3.70 k/uL Final    Absolute Mono # 05/18/2021 1.41* 0.10 - 1.20 k/uL Final    Absolute Eos # 05/18/2021 0.23  0.00 - 0.44 k/uL Final    Basophils Absolute 05/18/2021 0.10  0.00 - 0.20 k/uL Final    Absolute Immature Granulocyte 05/18/2021 0.06  0.00 - 0.30 k/uL Final    WBC Morphology 05/18/2021 NOT REPORTED   Final    RBC Morphology 05/18/2021 NOT REPORTED   Final    Platelet Estimate 68/32/1269 NOT REPORTED   Final    Hemoglobin A1C 05/18/2021 5.7  4.0 - 6.0 % Final    Estimated Avg Glucose 05/18/2021 117  mg/dL Final    Comment: The ADA and AACC recommend providing the estimated average glucose result to permit better   patient understanding of their HBA1c result.       Magnesium 05/18/2021 2.4  1.6 - 2.6 mg/dL Final    Glucose 05/18/2021 83  70 - 99 mg/dL Final    BUN 05/18/2021 16  8 - 23 mg/dL Final    CREATININE 05/18/2021 1.07  0.70 - 1.20 mg/dL Final    Bun/Cre Ratio 05/18/2021 NOT REPORTED  9 - 20 Final    Calcium 05/18/2021 9.7  8.6 - 10.4 mg/dL Final    Albumin 05/18/2021 4.5  3.5 - 5.2 g/dL Final    Phosphorus 05/18/2021 3.6  2.5 - 4.5 mg/dL Final    Sodium 05/18/2021 143  135 - 144 mmol/L Final    Potassium 05/18/2021 4.6  3.7 - 5.3 mmol/L Final    Chloride 05/18/2021 107  98 - 107 mmol/L Final    CO2 05/18/2021 23  20 - 31 mmol/L Final    Anion Gap 05/18/2021 13  9 - 17 mmol/L Final    GFR Non- 05/18/2021 >60  >60 mL/min Final    GFR  05/18/2021 >60 >60 mL/min Final    GFR Comment 05/18/2021        Final    Comment: Average GFR for 79or more years old:   76 mL/min/1.73sq m  Chronic Kidney Disease:   <60 mL/min/1.73sq m  Kidney failure:   <15 mL/min/1.73sq m              eGFR calculated using average adult body mass. Additional eGFR calculator available at:        Infini[employer]            GFR Staging 05/18/2021 NOT REPORTED   Final    TSH 05/18/2021 0.90  0.30 - 5.00 mIU/L Final    Color, UA 05/18/2021 DARK YELLOW* YELLOW Final    Turbidity UA 05/18/2021 TURBID* CLEAR Final    Glucose, Ur 05/18/2021 NEGATIVE  NEGATIVE Final    Bilirubin Urine 05/18/2021 NEGATIVE  NEGATIVE Final    Ketones, Urine 05/18/2021 NEGATIVE  NEGATIVE Final    Specific Gravity, UA 05/18/2021 1.026  1.005 - 1.030 Final    Urine Hgb 05/18/2021 NEGATIVE  NEGATIVE Final    pH, UA 05/18/2021 5.0  5.0 - 8.0 Final    Protein, UA 05/18/2021 NEGATIVE  NEGATIVE Final    Urobilinogen, Urine 05/18/2021 Normal  Normal Final    Nitrite, Urine 05/18/2021 NEGATIVE  NEGATIVE Final    Leukocyte Esterase, Urine 05/18/2021 NEGATIVE  NEGATIVE Final    - 05/18/2021        Final    WBC, UA 05/18/2021 None  0 - 5 /HPF Final    RBC, UA 05/18/2021 0 TO 2  0 - 4 /HPF Final    Reference range defined for non-centrifuged specimen.  Casts UA 05/18/2021 0 TO 2 HYALINE Reference range defined for non-centrifuged specimen. 0 - 8 /LPF Final    Crystals, UA 05/18/2021 NOT REPORTED  None /HPF Final    Epithelial Cells UA 05/18/2021 0 TO 2  0 - 5 /HPF Final    Renal Epithelial, UA 05/18/2021 NOT REPORTED  0 /HPF Final    Bacteria, UA 05/18/2021 NOT REPORTED  None Final    Mucus, UA 05/18/2021 NOT REPORTED  None Final    Trichomonas, UA 05/18/2021 NOT REPORTED  None Final    Amorphous, UA 05/18/2021 NOT REPORTED  None Final    Other Observations UA 05/18/2021 NOT REPORTED  NOT REQ.  Final    Yeast, UA 05/18/2021 NOT REPORTED  None Final    Vitamin B-12 05/18/2021 373  232 - 1245 pg/mL Final    Folate 05/18/2021 13.8  >4.8 ng/mL Final    Vit D, 25-Hydroxy 05/18/2021 50.1  30.0 - 100.0 ng/mL Final    Comment:    Reference Range:  Vitamin D status         Range   Deficiency              <20 ng/mL   Mild Deficiency       20-30 ng/mL   Sufficiency           ng/mL   Toxicity               >100 ng/mL       No results found for this visit on 02/02/22. Completed Orders/Prescriptions   Orders Placed This Encounter   Medications    lisinopril (PRINIVIL;ZESTRIL) 10 MG tablet     Sig: Take 1 tablet by mouth daily     Dispense:  90 tablet     Refill:  1     **Patient requests 90 days supply**               AssessmentPlan/Medical Decision Making     1. Primary hypertension    - CBC Auto Differential; Future  - Comprehensive Metabolic Panel; Future  - TSH with Reflex; Future  - Lipid, Fasting; Future  - lisinopril (PRINIVIL;ZESTRIL) 10 MG tablet; Take 1 tablet by mouth daily  Dispense: 90 tablet; Refill: 1    2. Mixed hyperlipidemia    - CBC Auto Differential; Future  - Comprehensive Metabolic Panel; Future  - TSH with Reflex; Future  - Lipid, Fasting; Future    3. Impotence of organic origin    - CBC Auto Differential; Future  - Comprehensive Metabolic Panel; Future  - TSH with Reflex; Future    4. Malignant tumor of prostate ([facility]      5. Encounter to establish care with new doctor        Return in about 6 months (around 8/2/2022) for htn/hld. 1.  Nolberto received counseling on the following healthy behaviors: nutrition, exercise and medication adherence  2. Patient given educational materials - see patient instructions  3. Was a self-tracking handout given in paper form or via [employer]? No  If yes, see orders or list here. 4.  Discussed use, benefit, and side effects of prescribed medications. Barriers to medication compliance addressed. All patient questions answered. Pt voiced understanding.    5.  Reviewed prior labs, imaging, consultation, follow up, and health maintenance  6. Continue current medications, diet and exercise. 7. Discussed use, benefit, and side effects of prescribed medications. Barriers to medication compliance addressed. All her questions were answered. Pt voiced understanding. Soumya Pavon will continue current medications, diet and exercise. Of the 30 minute duration appointment visit, Kemal Weaver CNP spent at least 50% of the face-to-face time in counseling, explanation of diagnosis, planning of further management, and answering all questions.           Signed:  Kemal Weaver CNP

## 2022-07-22 DIAGNOSIS — Z76.0 MEDICATION REFILL: ICD-10-CM

## 2022-07-22 NOTE — TELEPHONE ENCOUNTER
Last Visit:2/2/2022 6/30/2021     Next Visit Date:8/9/2022  Future Appointments   Date Time Provider Pratibha Fajardo   8/9/2022 10:20 AM NO [place] Maintenance   Topic Date Due    Prostate Specific Antigen (PSA) Screening or Monitoring  09/11/2015    Annual Wellness Visit (AWV)  11/18/2021    Lipids  11/21/2021    COVID-19 Vaccine (4 - Booster for Pfizer series) 02/06/2022    A1C test (Diabetic or Prediabetic)  05/18/2022    Depression Screen  06/30/2022    DTaP/Tdap/Td vaccine (1 - Tdap) 01/13/2023 (Originally 9/6/1964)    Shingles vaccine (1 of 2) 01/13/2023 (Originally 9/6/1997)    Flu vaccine (1) 09/01/2022    Colorectal Cancer Screen  01/16/2023    Pneumococcal 65+ years Vaccine  Completed    Hepatitis C screen  Completed    Hepatitis A vaccine  Aged Out    Hepatitis B vaccine  Aged Out    Hib vaccine  Aged Out    Meningococcal (ACWY) vaccine  Aged Out       Hemoglobin A1C (%)   Date Value   05/18/2021 5.7             ( goal A1C is < 7)   No results found for: LABMICR  LDL Calculated (mg/dL)   Date Value   11/21/2020 87   12/20/2016 63       (goal LDL is <100)   AST (U/L)   Date Value   05/18/2021 27     ALT (U/L)   Date Value   05/18/2021 19     BUN (mg/dL)   Date Value   05/18/2021 16     BP Readings from Last 3 Encounters:   02/02/22 118/74   01/13/22 132/82   12/28/21 (!) 149/91          (goal 120/80)    All Future Testing planned in [facility]  Lab Frequency Next Occurrence   CBC Auto Differential Once 09/01/2022   Comprehensive Metabolic Panel Once 04/26/9911   TSH with Reflex Once 09/01/2022   Lipid, Fasting Once 09/01/2022               Patient Active Problem List:     History of shingles     History of prostate cancer     History of pulmonary embolism     Impotence of organic origin     Malignant tumor of prostate ([address]     Mixed hyperlipidemia     Primary hypertension

## 2022-07-27 LAB
ALBUMIN SERPL-MCNC: 4.6 G/DL
ALP BLD-CCNC: 67 U/L
ALT SERPL-CCNC: 40 U/L
ANION GAP SERPL CALCULATED.3IONS-SCNC: 12 MMOL/L
AST SERPL-CCNC: 41 U/L
BASOPHILS ABSOLUTE: 0.07 /ΜL
BASOPHILS RELATIVE PERCENT: 1 %
BILIRUB SERPL-MCNC: 0.9 MG/DL (ref 0.1–1.4)
BUN BLDV-MCNC: 18 MG/DL
CALCIUM SERPL-MCNC: 9.7 MG/DL
CHLORIDE BLD-SCNC: 105 MMOL/L
CHOLESTEROL, FASTING: 126
CO2: 24 MMOL/L
CREAT SERPL-MCNC: 1.17 MG/DL
EOSINOPHILS ABSOLUTE: 0.21 /ΜL
EOSINOPHILS RELATIVE PERCENT: 2.9 %
GFR CALCULATED: 65
GLUCOSE BLD-MCNC: 94 MG/DL
HCT VFR BLD CALC: 47.6 % (ref 41–53)
HDLC SERPL-MCNC: 28 MG/DL (ref 35–70)
HEMOGLOBIN: 15.5 G/DL (ref 13.5–17.5)
LDL CHOLESTEROL CALCULATED: 49 MG/DL (ref 0–160)
LYMPHOCYTES ABSOLUTE: 3.14 /ΜL
LYMPHOCYTES RELATIVE PERCENT: 43.3 %
MCH RBC QN AUTO: 27.8 PG
MCHC RBC AUTO-ENTMCNC: 32.6 G/DL
MCV RBC AUTO: 85.3 FL
MONOCYTES ABSOLUTE: 1.03 /ΜL
MONOCYTES RELATIVE PERCENT: 14.2 %
NEUTROPHILS ABSOLUTE: 2.76 /ΜL
NEUTROPHILS RELATIVE PERCENT: 37.9 %
PDW BLD-RTO: 13.2 %
PLATELET # BLD: 220 K/ΜL
PMV BLD AUTO: 12.5 FL
POTASSIUM SERPL-SCNC: 4.7 MMOL/L
RBC # BLD: 5.58 10^6/ΜL
SODIUM BLD-SCNC: 141 MMOL/L
TOTAL PROTEIN: 7.2
TRIGLYCERIDE, FASTING: 243
TSH SERPL DL<=0.05 MIU/L-ACNC: 1.11 UIU/ML
WBC # BLD: 7.3 10^3/ML

## 2022-08-01 DIAGNOSIS — E78.2 MIXED HYPERLIPIDEMIA: ICD-10-CM

## 2022-08-01 DIAGNOSIS — N52.9 IMPOTENCE OF ORGANIC ORIGIN: ICD-10-CM

## 2022-08-01 DIAGNOSIS — I10 PRIMARY HYPERTENSION: ICD-10-CM

## 2022-08-08 ENCOUNTER — TELEPHONE (OUTPATIENT)
Dept: FAMILY MEDICINE CLINIC | Age: 77
End: 2022-08-08

## 2022-08-08 NOTE — TELEPHONE ENCOUNTER
Nolan Cates was making my appointment calls for 08/09/22. Patient's mail box was full so I called Mckenzie--Ex-wife    She is in [place] with her sister and wants you to know pt's memory is getting worse. He lost his phone, gets lost when out at places. Can't remember how to work GPS. I didn't know she was his ex until she said something but she is dealing with things for him because his children aren't much help. I was asked to relay this information to you for her.     Niru Méndez

## 2022-08-09 ENCOUNTER — OFFICE VISIT (OUTPATIENT)
Dept: FAMILY MEDICINE CLINIC | Age: 77
End: 2022-08-09
Payer: MEDICARE

## 2022-08-09 VITALS
DIASTOLIC BLOOD PRESSURE: 60 MMHG | HEART RATE: 70 BPM | BODY MASS INDEX: 34.5 KG/M2 | SYSTOLIC BLOOD PRESSURE: 104 MMHG | WEIGHT: 201 LBS | TEMPERATURE: 97.5 F | OXYGEN SATURATION: 95 %

## 2022-08-09 DIAGNOSIS — R01.1 HEART MURMUR ON PHYSICAL EXAMINATION: ICD-10-CM

## 2022-08-09 DIAGNOSIS — R41.3 MEMORY LOSS: ICD-10-CM

## 2022-08-09 DIAGNOSIS — I10 PRIMARY HYPERTENSION: Primary | ICD-10-CM

## 2022-08-09 DIAGNOSIS — E78.2 MIXED HYPERLIPIDEMIA: ICD-10-CM

## 2022-08-09 DIAGNOSIS — R26.81 GAIT INSTABILITY: ICD-10-CM

## 2022-08-09 DIAGNOSIS — Z85.46 HISTORY OF PROSTATE CANCER: Chronic | ICD-10-CM

## 2022-08-09 PROBLEM — C61 MALIGNANT TUMOR OF PROSTATE (HCC): Status: RESOLVED | Noted: 2020-11-17 | Resolved: 2022-08-09

## 2022-08-09 PROCEDURE — 3017F COLORECTAL CA SCREEN DOC REV: CPT | Performed by: NURSE PRACTITIONER

## 2022-08-09 PROCEDURE — G8417 CALC BMI ABV UP PARAM F/U: HCPCS | Performed by: NURSE PRACTITIONER

## 2022-08-09 PROCEDURE — G8427 DOCREV CUR MEDS BY ELIG CLIN: HCPCS | Performed by: NURSE PRACTITIONER

## 2022-08-09 PROCEDURE — 99214 OFFICE O/P EST MOD 30 MIN: CPT | Performed by: NURSE PRACTITIONER

## 2022-08-09 PROCEDURE — 1036F TOBACCO NON-USER: CPT | Performed by: NURSE PRACTITIONER

## 2022-08-09 PROCEDURE — 1123F ACP DISCUSS/DSCN MKR DOCD: CPT | Performed by: NURSE PRACTITIONER

## 2022-08-09 PROCEDURE — 3288F FALL RISK ASSESSMENT DOCD: CPT | Performed by: NURSE PRACTITIONER

## 2022-08-09 ASSESSMENT — PATIENT HEALTH QUESTIONNAIRE - PHQ9
SUM OF ALL RESPONSES TO PHQ QUESTIONS 1-9: 0
SUM OF ALL RESPONSES TO PHQ QUESTIONS 1-9: 0
SUM OF ALL RESPONSES TO PHQ9 QUESTIONS 1 & 2: 0
SUM OF ALL RESPONSES TO PHQ QUESTIONS 1-9: 0
1. LITTLE INTEREST OR PLEASURE IN DOING THINGS: 0
2. FEELING DOWN, DEPRESSED OR HOPELESS: 0
SUM OF ALL RESPONSES TO PHQ QUESTIONS 1-9: 0

## 2022-08-09 NOTE — PROGRESS NOTES
[place] [address]  [address] [facility]. [place] [facility], [facility] [address]  [phone]  [phone]    Estee Babb is a 78 y.o. male who is here with c/o of:    Chief Complaint: Hypertension, Memory Loss, and Urinary Retention      Patient Accompanied by: n/a    HPI - Estee Babb is here today with c/o:    Patient here for re evaluation of chronic conditions. HTN-  Compliant with medication. Averages 118-120/80's at home. Denies chest pain, shortness of breath, headaches or swelling. Does have occasional dizziness. Hyperlipidemia-  Complaint with medication. Hx prostate ca-   Follows with urology every October. Health Concerns-  Reports that he has been having issues with his memory for the last year but has been getting worse. He says he has days where he forgets where he is driving to. He says he forgets where he puts things. He finds he double checks himself to make sure he doesn't leave stove on or that his doors locked and lights off. He says today he would like to go to his daughters house but doesn't know how to get there. Health Maintenance Due   Topic Date Due    Prostate Specific Antigen (PSA) Screening or Monitoring  09/11/2015    Annual Wellness Visit (AWV)  11/18/2021    Depression Screen  06/30/2022        Patient Active Problem List:     History of shingles     History of prostate cancer     History of pulmonary embolism     Impotence of organic origin     Malignant tumor of prostate ([address]     Mixed hyperlipidemia     Primary hypertension     Past Medical History:   Diagnosis Date    Arthritis     Hand, bilateral and Posterior Neck    Cancer ([address] 2008    Prostate, radiation seeds implanted. Foot fracture, right     GERD (gastroesophageal reflux disease)     Tums takes it away per pt.     Hx of blood clots 2012    Bilateral Lung, Left Leg    Pulmonary embolism ([address] 2012    Shoulder fracture, right     Wears glasses       Past Surgical History: Procedure Laterality Date    COLONOSCOPY      HERNIA REPAIR Right     Hernia at 10yrs old approx. and 1994 Hernia repair. Unknown type per pt. INGUINAL HERNIA REPAIR Right 07/24/2018    [facility]    LUNG SURGERY Left 2006    lung biopsy    [facility] REPAIR ING HERNIA,5+Y/O,REDUCIBL Right 7/24/2018    RECURRENT HERNIA INGUINAL REPAIR RIGHT WITH MESH performed by Chrissie Jacinto MD at [address] [place] [address] History   Problem Relation Age of Onset    Heart Attack Mother     Cancer Father         lung ca     Social History     Tobacco Use    Smoking status: Never    Smokeless tobacco: Never   Substance Use Topics    Alcohol use: Yes     Comment: Occasion     ALLERGIES:  No Known Allergies       Subjective   Review of Systems   Constitutional:  Negative for activity change, appetite change,unexpected weight change, chills, fever, and fatigue. HENT: Negative for ear pain, sore throat,  Rhinorrhea, sinus pain, sinus pressure, congestion. Eyes:  Negative for pain and discharge. Respiratory:  Negative for chest tightness, shortness of breath, wheezing, and cough. Cardiovascular:  Negative for chest pain, palpitations and leg swelling. Gastrointestinal: Negative for abdominal pain, blood in stool, constipation,diarrhea, nausea and vomiting. Endocrine: Negative for cold intolerance, heat intolerance, polydipsia, polyphagia and polyuria. Genitourinary: Negative for difficulty urinating, dysuria, flank pain, frequency, hematuria and urgency. Musculoskeletal: Negative for arthralgias, back pain, joint swelling, myalgias, neck pain and neck stiffness. Skin: Negative for rash and wound. Allergic/Immunologic: Negative for environmental allergies and food allergies. Neurological:  Negative for dizziness, light-headedness, numbness and headaches. Positive for memory loss and dizziness  Hematological:  Negative for adenopathy. Does not bruise/bleed easily.    Psychiatric/Behavioral: Negative for self-injury, sleep disturbance and suicidal ideas. Objective   Physical Exam  Cardiovascular:      Heart sounds: Murmur heard. PHYSICAL EXAM:  Constitutional: Khadijah Wakefield is oriented to person, place, and time. Vital signs are normal. Appears well-developed and well-nourished. Head: Normocephalic and atraumatic. Eyes:PERRL, EOMI, Conjunctiva normal, No discharge. Neck: Full passive range of motion. Non-tender on palpation. Neck supple. No thyromegaly present. Trachea normal.  Pulmonary/Chest: Breath sounds are clear throughout, No respiratory distress, No wheezing, No chest tenderness. Effort normal  Abdominal: Soft. Normal appearance, bowel sounds are present and normoactive. There is no hepatosplenomegaly. There is no tenderness. There is no CVA tenderness. Musculoskeletal: Extremities appear regular and symmetric. No evident masses, lesions, foreign bodies, or other abnormalities. No edema. No tenderness on palpation. Joints are stable. Full ROM, strength and tone are within normal limits. Neurological: Alert and oriented to person, place, and time. Normal motor function, Normal sensory function, No focal deficits noted. He has normal strength. Skin: Skin is warm, dry and intact. No obvious lesions on exposed skin  Psychiatric: Normal mood and affect. Speech is normal and behavior is normal.     Nursing note and vitals reviewed. Blood pressure 104/60, pulse 70, temperature 97.5 °F (36.4 °C), temperature source Temporal, weight 201 lb (91.2 kg), SpO2 95 %. Body mass index is 34.5 kg/m².     Wt Readings from Last 3 Encounters:   08/09/22 201 lb (91.2 kg)   02/02/22 194 lb (88 kg)   01/13/22 195 lb (88.5 kg)     BP Readings from Last 3 Encounters:   08/09/22 104/60   02/02/22 118/74   01/13/22 132/82       Orders Only on 08/01/2022   Component Date Value Ref Range Status    Cholesterol, Fasting 07/27/2022 126   Final    Triglyceride, Fasting 07/27/2022 243   Final    HDL 07/27/2022 28 (A) 35 - 70 mg/dL Final    LDL Calculated 07/27/2022 49  0 - 160 mg/dL Final    Sodium 07/27/2022 141  mmol/L Final    Chloride 07/27/2022 105  mmol/L Final    Potassium 07/27/2022 4.7  mmol/L Final    BUN 07/27/2022 18  mg/dL Final    Creatinine 07/27/2022 1.17   Final    Glucose 07/27/2022 94  mg/dL Final    AST 07/27/2022 41  U/L Final    ALT 07/27/2022 40  U/L Final    Calcium 07/27/2022 9.7  mg/dL Final    Total Protein 07/27/2022 7.2   Final    CO2 07/27/2022 24  mmol/L Final    Albumin 07/27/2022 4.6   Final    Alkaline Phosphatase 07/27/2022 67  U/L Final    Total Bilirubin 07/27/2022 0.9  0.1 - 1.4 mg/dL Final    Gfr Calculated 07/27/2022 65   Final    Anion Gap 07/27/2022 12.0  mmol/L Final    TSH 07/27/2022 1.11  uIU/mL Final    WBC 07/27/2022 7.3  10^3/mL Final    RBC 07/27/2022 5.58  10^6/µL Final    Hemoglobin 07/27/2022 15.5  13.5 - 17.5 g/dL Final    Hematocrit 07/27/2022 47.6  41 - 53 % Final    MCV 07/27/2022 85.3  fL Final    MCH 07/27/2022 27.8  pg Final    MCHC 07/27/2022 32.6  g/dL Final    Platelets 71/46/3292 220  K/µL Final    RDW 07/27/2022 13.2  % Final    MPV 07/27/2022 12.5  fL Final    Neutrophils % 07/27/2022 37.9  % Final    Lymphocytes % 07/27/2022 43.3  % Final    Monocytes % 07/27/2022 14.2  % Final    Eosinophils % 07/27/2022 2.9  % Final    Basophils % 07/27/2022 1.0  % Final    Neutrophils Absolute 07/27/2022 2.76  /µL Final    Lymphocytes Absolute 07/27/2022 3.14  /µL Final    Monocytes Absolute 07/27/2022 1.03  /µL Final    Eosinophils Absolute 07/27/2022 0.21  /µL Final    Basophils Absolute 07/27/2022 0.07  /µL Final     No results found for this visit on 08/09/22. Completed Orders/Prescriptions   No orders of the defined types were placed in this encounter. AssessmentPlan/Medical Decision Making     1. Primary hypertension    - Stable  - Continue Lisinopril 10 mg     2. History of prostate cancer    - Follows with Urology    3.  Mixed hyperlipidemia    - Stable  - Continue Crestor 5 mg daily    4. Memory loss    - will refer for further evaluation  - RAJENDRA - Khoa Cunningham MD, Neurology, [place]    5. Gait instability    - RAJENDRA - Khoa Cunningham MD, Neurology, [place]    6. Heart murmur on physical examination    - will order ECHO; may need to see Cardiology  - Echocardiogram complete; Future    Return in about 3 months (around 11/9/2022) for [address] [place] [address]. 1.  Nolberto received counseling on the following healthy behaviors: nutrition, exercise, and medication adherence  2. Patient given educational materials - see patient instructions  3. Was a self-tracking handout given in paper form or via [employer]? No  If yes, see orders or list here. 4.  Discussed use, benefit, and side effects of prescribed medications. Barriers to medication compliance addressed. All patient questions answered. Pt voiced understanding. 5.  Reviewed prior labs, imaging, consultation, follow up, and health maintenance  6. Continue current medications, diet and exercise. 7. Discussed use, benefit, and side effects of prescribed medications. Barriers to medication compliance addressed. All her questions were answered. Pt voiced understanding. Jose Jauregui will continue current medications, diet and exercise. Of the  30  minute duration appointment visit, Sarah Ivan CNP spent at least 50% of the face-to-face time in counseling, explanation of diagnosis, planning of further management, and answering all questions.           Signed:  Sarah Ivan CNP

## 2022-08-12 ENCOUNTER — HOSPITAL ENCOUNTER (OUTPATIENT)
Dept: NON INVASIVE DIAGNOSTICS | Age: 77
Discharge: HOME | End: 2022-08-14
Payer: MEDICARE

## 2022-08-12 DIAGNOSIS — R01.1 HEART MURMUR ON PHYSICAL EXAMINATION: ICD-10-CM

## 2022-08-12 LAB
LV EF: 55 %
LVEF MODALITY: NORMAL

## 2022-08-12 PROCEDURE — 93306 TTE W/DOPPLER COMPLETE: CPT

## 2022-10-29 DIAGNOSIS — Z76.0 MEDICATION REFILL: ICD-10-CM

## 2022-10-31 NOTE — TELEPHONE ENCOUNTER
Marina Shipman is calling to request a refill on the following medication(s):    Medication Request:  Requested Prescriptions     Pending Prescriptions Disp Refills    rivaroxaban (XARELTO) 20 MG TABS tablet [Pharmacy Med Name: XARELTO 20MG TABLETS] 90 tablet 0     Sig: TAKE 1 TABLET BY MOUTH EVERY DAY       Last Visit Date (If Applicable):  0/1/2669    Next Visit Date:    11/10/2022

## 2022-12-20 ENCOUNTER — OFFICE VISIT (OUTPATIENT)
Dept: FAMILY MEDICINE CLINIC | Age: 77
End: 2022-12-20
Payer: MEDICARE

## 2022-12-20 VITALS — HEIGHT: 65 IN | BODY MASS INDEX: 31.65 KG/M2 | WEIGHT: 190 LBS

## 2022-12-20 DIAGNOSIS — Z71.89 ACP (ADVANCE CARE PLANNING): ICD-10-CM

## 2022-12-20 DIAGNOSIS — Z13.31 DEPRESSION SCREENING: ICD-10-CM

## 2022-12-20 DIAGNOSIS — Z00.00 MEDICARE ANNUAL WELLNESS VISIT, SUBSEQUENT: Primary | ICD-10-CM

## 2022-12-20 PROCEDURE — G0439 PPPS, SUBSEQ VISIT: HCPCS | Performed by: NURSE PRACTITIONER

## 2022-12-20 PROCEDURE — G0444 DEPRESSION SCREEN ANNUAL: HCPCS | Performed by: NURSE PRACTITIONER

## 2022-12-20 PROCEDURE — 1123F ACP DISCUSS/DSCN MKR DOCD: CPT | Performed by: NURSE PRACTITIONER

## 2022-12-20 PROCEDURE — 99497 ADVNCD CARE PLAN 30 MIN: CPT | Performed by: NURSE PRACTITIONER

## 2022-12-20 ASSESSMENT — PATIENT HEALTH QUESTIONNAIRE - PHQ9
SUM OF ALL RESPONSES TO PHQ9 QUESTIONS 1 & 2: 0
2. FEELING DOWN, DEPRESSED OR HOPELESS: 0
SUM OF ALL RESPONSES TO PHQ QUESTIONS 1-9: 0
SUM OF ALL RESPONSES TO PHQ QUESTIONS 1-9: 0
1. LITTLE INTEREST OR PLEASURE IN DOING THINGS: 0
SUM OF ALL RESPONSES TO PHQ QUESTIONS 1-9: 0
SUM OF ALL RESPONSES TO PHQ QUESTIONS 1-9: 0

## 2022-12-20 ASSESSMENT — LIFESTYLE VARIABLES
HOW OFTEN DO YOU HAVE A DRINK CONTAINING ALCOHOL: 2-4 TIMES A MONTH
HOW MANY STANDARD DRINKS CONTAINING ALCOHOL DO YOU HAVE ON A TYPICAL DAY: 1 OR 2

## 2022-12-20 NOTE — PROGRESS NOTES
Medicare Annual Wellness Visit    Checo Iglesias is here for Medicare AWV    Assessment & Plan   Medicare annual wellness visit, subsequent  ACP (advance care planning)  -     [place] (16-30 minutes) [11591]  Depression screening  -     [place] DEPRESS[place] SCRE[place] ANNUAL  PHQ-9 Total Score: 0 (12/20/2022 10:44 AM)       Recommendations for Preventive Services Due: see orders and patient instructions/AVS.  Recommended screening schedule for the next 5-10 years is provided to the patient in written form: see Patient Instructions/AVS.     Return for Medicare Annual Wellness Visit in 1 year. Subjective       Patient's complete Health Risk Assessment and screening values have been reviewed and are found in Flowsheets. The following problems were reviewed today and where indicated follow up appointments were made and/or referrals ordered. Positive Risk Factor Screenings with Interventions:    Fall Risk:  Do you feel unsteady or are you worried about falling? : no  2 or more falls in past year?: (!) yes  Fall with injury in past year?: no     Interventions:    Patient declines any further evaluation or treatment              Weight and Activity:  Physical Activity: Insufficiently Active    Days of Exercise per Week: 4 days    Minutes of Exercise per Session: 20 min     On average, how many days per week do you engage in moderate to strenuous exercise (like a brisk walk)?: 4 days  Have you lost any weight without trying in the past 3 months?: No  Body mass index: (!) 31.61    Obesity Interventions:  Patient declines any further evaluation or treatment            Vision Screen:  Do you have difficulty driving, watching TV, or doing any of your daily activities because of your eyesight?: No  Have you had an eye exam within the past year?: (!) No  No results found.     Interventions:   Patient encouraged to make appointment with their eye specialist      Advanced Directives:  Do you have a Living Will?: (!) No    Intervention:  has NO advanced directive  - referred to [address] [place] [address] Planning: Discussed the patients choices for care and treatment in case of a health event that adversely affects decision-making abilities. Also discussed the patients long-term treatment options. Reviewed with the patient the appropriate state-specific advance directive documents. Reviewed the process of designating a competent adult as an Agent (or -in-fact) that could take make health care decisions for the patient if incompetent. Patient was asked to complete the declaration forms, if they have not already, either acknowledge the forms by a public notary or an eligible witness and provide a signed copy to the practice office. Time spent (minutes): 5                      Objective   Vitals:    12/20/22 1048   Weight: 190 lb (86.2 kg)   Height: 5' 5\" (1.651 m)      Body mass index is 31.62 kg/m². No Known Allergies  Prior to Visit Medications    Medication Sig Taking? Authorizing Provider   rivaroxaban (XARELTO) 20 MG TABS tablet TAKE 1 TABLET BY MOUTH EVERY DAY Yes NO Hernadez CNP   lisinopril (PRINIVIL;ZESTRIL) 10 MG tablet Take 1 tablet by mouth daily Yes NO Hernadez CNP   rosuvastatin (CRESTOR) 5 MG tablet TAKE 1 TABLET BY MOUTH DAILY Yes Historical Provider, MD   acetaminophen (TYLENOL) 325 MG tablet Take 650 mg by mouth every 6 hours as needed for Pain Yes Historical Provider, MD   sildenafil (REVATIO) 20 MG tablet sildenafil (pulmonary hypertension) 20 mg tablet   Take 1 tablet 3 times a day by oral route.  Yes Historical Provider, MD Jarrell (Including outside providers/suppliers regularly involved in providing care):   Patient Care Team:  NO Hernadez CNP as PCP - General (Nurse Practitioner)  NO Hernadez CNP as PCP - [facility] [place] [facility]  General Sarah MD as Consulting Physician (Urology)  Lian Davison Venancio Storey MD as Consulting Physician  Pauline Alejandro MD as Consulting Physician (Pulmonology)     Reviewed and updated this visit:  Tobacco  Allergies  Meds  Problems  Med Hx  Surg Hx  Soc Hx  Fam Hx

## 2022-12-20 NOTE — PATIENT INSTRUCTIONS
Preventing Falls: Care Instructions  Overview     Getting around your home safely can be a challenge if you have injuries or health problems that make it easy for you to fall. Loose rugs and furniture in walkways are among the dangers for many older people who have problems walking or who have poor eyesight. People who have conditions such as arthritis, osteoporosis, or dementia also have to be careful not to fall. You can make your home safer with a few simple measures. Follow-up care is a key part of your treatment and safety. Be sure to make and go to all appointments, and call your doctor if you are having problems. It's also a good idea to know your test results and keep a list of the medicines you take. How can you care for yourself at home? Taking care of yourself  Exercise regularly to improve your strength, muscle tone, and balance. Walk if you can. Swimming may be a good choice if you cannot walk easily. Have your vision and hearing checked each year or any time you notice a change. If you have trouble seeing and hearing, you might not be able to avoid objects and could lose your balance. Know the side effects of the medicines you take. Ask your doctor or pharmacist whether the medicines you take can affect your balance. Sleeping pills or sedatives can affect your balance. Limit the amount of alcohol you drink. Alcohol can impair your balance and other senses. Ask your doctor whether calluses or corns on your feet need to be removed. If you wear loose-fitting shoes because of calluses or corns, you can lose your balance and fall. Talk to your doctor if you have numbness in your feet. You may get dizzy if you do not drink enough water. To prevent dehydration, drink plenty of fluids. Choose water and other clear liquids. If you have kidney, heart, or liver disease and have to limit fluids, talk with your doctor before you increase the amount of fluids you drink.   Preventing falls at home  Remove raised doorway thresholds, throw rugs, and clutter. Repair loose carpet or raised areas in the floor. Move furniture and electrical cords to keep them out of walking paths. Use nonskid floor wax, and wipe up spills right away, especially on ceramic tile floors. If you use a walker or cane, put rubber tips on it. If you use crutches, clean the bottoms of them regularly with an abrasive pad, such as steel wool. Keep your house well lit, especially stairways, porches, and outside walkways. Use night-lights in areas such as hallways and bathrooms. Add extra light switches or use remote switches (such as switches that go on or off when you clap your hands) to make it easier to turn lights on if you have to get up during the night. Install sturdy handrails on stairways. Move items in your cabinets so that the things you use a lot are on the lower shelves (about waist level). Keep a cordless phone and a flashlight with new batteries by your bed. If possible, put a phone in each of the main rooms of your house, or carry a cell phone in case you fall and cannot reach a phone. Or, you can wear a device around your neck or wrist. You push a button that sends a signal for help. Wear low-heeled shoes that fit well and give your feet good support. Use footwear with nonskid soles. Check the heels and soles of your shoes for wear. Repair or replace worn heels or soles. Do not wear socks without shoes on smooth floors, such as wood. Walk on the grass when the sidewalks are slippery. If you live in an area that gets snow and ice in the winter, sprinkle salt on slippery steps and sidewalks. Or ask a family member or friend to do this for you. Preventing falls in the bath  Install grab bars and nonskid mats inside and outside your shower or tub and near the toilet and sinks. Use shower chairs and bath benches. Use a hand-held shower head that will allow you to sit while showering.   Get into a tub or shower by putting the weaker leg in first. Get out of a tub or shower with your strong side first.  Repair loose toilet seats and consider installing a raised toilet seat to make getting on and off the toilet easier. Keep your bathroom door unlocked while you are in the shower. Where can you learn more? Go to http://www.dykes.com/ and enter G117 to learn more about \"Preventing Falls: Care Instructions. \"  Current as of: May 4, 2022               Content Version: 13.5  © 7653-4889 Healthwise, Incorporated. Care instructions adapted under license by [facility] ([facility]). If you have questions about a medical condition or this instruction, always ask your healthcare professional. [address] any warranty or liability for your use of this information. Hearing Loss: Care Instructions  Overview     Hearing loss is a sudden or slow decrease in how well you hear. It can range from mild to severe. Permanent hearing loss can occur with aging. It also can happen when you are exposed long-term to loud noise. Examples include listening to loud music, riding motorcycles, or being around other loud machines. Hearing loss can affect your work and home life. It can make you feel lonely or depressed. You may feel that you have lost your independence. But hearing aids and other devices can help you hear better and feel connected to others. Follow-up care is a key part of your treatment and safety. Be sure to make and go to all appointments, and call your doctor if you are having problems. It's also a good idea to know your test results and keep a list of the medicines you take. How can you care for yourself at home? Avoid loud noises whenever possible. This helps keep your hearing from getting worse. Always wear hearing protection around loud noises. Wear a hearing aid as directed. See a professional who can help you pick a hearing aid that fits you. Have hearing tests as your doctor suggests. They can show whether your hearing has changed. Your hearing aid may need to be adjusted. Use other devices as needed. These may include:  Telephone amplifiers and hearing aids that can connect to a television, stereo, radio, or microphone. Devices that use lights or vibrations. These alert you to the doorbell, a ringing telephone, or a baby monitor. Television closed-captioning. This shows the words at the bottom of the screen. Most new TVs can do this. TTY (text telephone). This lets you type messages back and forth on the telephone instead of talking or listening. These devices are also called TDD. When messages are typed on the keyboard, they are sent over the phone line to a receiving TTY. The message is shown on a monitor. Use text messaging, social media, and email if it is hard for you to communicate by telephone. Try to learn a listening technique called speechreading. It is not lipreading. You pay attention to people's gestures, expressions, posture, and tone of voice. These clues can help you understand what a person is saying. Face the person you are talking to, and have them face you. Make sure the lighting is good. You need to see the other person's face clearly. Think about counseling if you need help to adjust to your hearing loss. When should you call for help? Watch closely for changes in your health, and be sure to contact your doctor if:    You think your hearing is getting worse.     You have new symptoms, such as dizziness or nausea. Where can you learn more? Go to http://www.BOOK A TIGER.com/ and enter R798 to learn more about \"Hearing Loss: Care Instructions. \"  Current as of: May 4, 2022               Content Version: 13.5  © 5687-1309 Healthwise, Incorporated. Care instructions adapted under license by [facility] ([facility]).  If you have questions about a medical condition or this instruction, always ask your healthcare professional. Michael Red any warranty or liability for your use of this information. Learning About Vision Tests  What are vision tests? The four most common vision tests are visual acuity tests, refraction, visual field tests, and color vision tests. Visual acuity (sharpness) tests  These tests are used: To see if you need glasses or contact lenses. To monitor an eye problem. To check an eye injury. Visual acuity tests are done as part of routine exams. You may also have this test when you get your 's license or apply for some types of jobs. Visual field tests  These tests are used: To check for vision loss in any area of your range of vision. To screen for certain eye diseases. To look for nerve damage after a stroke, head injury, or other problem that could reduce blood flow to the brain. Refraction and color tests  A refraction test is done to find the right prescription for glasses and contact lenses. A color vision test is done to check for color blindness. Color vision is often tested as part of a routine exam. You may also have this test when you apply for a job where recognizing different colors is important, such as , electronics, or the [employer]. How are vision tests done? Visual acuity test   You cover one eye at a time. You read aloud from a wall chart across the room. You read aloud from a small card that you hold in your hand. Refraction   You look into a special device. The device puts lenses of different strengths in front of each eye to see how strong your glasses or contact lenses need to be. Visual field tests   Your doctor may have you look through special machines. Or your doctor may simply have you stare straight ahead while they move a finger into and out of your field of vision. Color vision test   You look at pieces of printed test patterns in various colors. You say what number or symbol you see.   Your doctor may have you trace the number or symbol using a pointer. How do these tests feel? There is very little chance of having a problem from this test. If dilating drops are used for a vision test, they may make the eyes sting and cause a medicine taste in the mouth. Follow-up care is a key part of your treatment and safety. Be sure to make and go to all appointments, and call your doctor if you are having problems. It's also a good idea to know your test results and keep a list of the medicines you take. Where can you learn more? Go to http://www.dykes.com/ and enter G551 to learn more about \"Learning About Vision Tests. \"  Current as of: October 12, 2022               Content Version: 13.5  © 2006-2022 [employer]. Care instructions adapted under license by [facility] [address] If you have questions about a medical condition or this instruction, always ask your healthcare professional. [place] [address] any warranty or liability for your use of this information. Advance Directives: Care Instructions  Overview  An advance directive is a legal way to state your wishes at the end of your life. It tells your family and your doctor what to do if you can't say what you want. There are two main types of advance directives. You can change them any time your wishes change. Living will. This form tells your family and your doctor your wishes about life support and other treatment. The form is also called a declaration. Medical power of . This form lets you name a person to make treatment decisions for you when you can't speak for yourself. This person is called a health care agent (health care proxy, health care surrogate). The form is also called a durable power of  for health care. If you do not have an advance directive, decisions about your medical care may be made by a family member, or by a doctor or a  who doesn't know you.   It may help to think of an advance directive as a gift to the people who care for you. If you have one, they won't have to make tough decisions by themselves. For more information, including forms for your state, see the [address] website (www.caringinfo.org/planning/advance-directives/). Follow-up care is a key part of your treatment and safety. Be sure to make and go to all appointments, and call your doctor if you are having problems. It's also a good idea to know your test results and keep a list of the medicines you take. What should you include in an advance directive? Many states have a unique advance directive form. (It may ask you to address specific issues.) Or you might use a universal form that's approved by many states. If your form doesn't tell you what to address, it may be hard to know what to include in your advance directive. Use the questions below to help you get started. Who do you want to make decisions about your medical care if you are not able to? What life-support measures do you want if you have a serious illness that gets worse over time or can't be cured? What are you most afraid of that might happen? (Maybe you're afraid of having pain, losing your independence, or being kept alive by machines.)  Where would you prefer to die? (Your home? A hospital? A nursing home?)  Do you want to donate your organs when you die? Do you want certain Buddhist practices performed before you die? When should you call for help? Be sure to contact your doctor if you have any questions. Where can you learn more? Go to http://www.dykes.com/ and enter R264 to learn more about \"Advance Directives: Care Instructions. \"  Current as of: June 16, 2022               Content Version: 13.5  © 6441-5663 Healthwise, Incorporated. Care instructions adapted under license by [facility] ([facility]).  If you have questions about a medical condition or this instruction, always ask your healthcare professional. Dayana Ahuja disclaims any warranty or liability for your use of this information. Personalized Preventive Plan for Cora Reed - 12/20/2022  Medicare offers a range of preventive health benefits. Some of the tests and screenings are paid in full while other may be subject to a deductible, co-insurance, and/or copay. Some of these benefits include a comprehensive review of your medical history including lifestyle, illnesses that may run in your family, and various assessments and screenings as appropriate. After reviewing your medical record and screening and assessments performed today your provider may have ordered immunizations, labs, imaging, and/or referrals for you. A list of these orders (if applicable) as well as your Preventive Care list are included within your After Visit Summary for your review. Other Preventive Recommendations:    A preventive eye exam performed by an eye specialist is recommended every 1-2 years to screen for glaucoma; cataracts, macular degeneration, and other eye disorders. A preventive dental visit is recommended every 6 months. Try to get at least 150 minutes of exercise per week or 10,000 steps per day on a pedometer . Order or download the FREE \"Exercise & Physical Activity: Your Everyday Guide\" from The [employer]. Call 1-493.592.3116 or search The [employer] online. You need 6083-7182 mg of calcium and 1138-4499 IU of vitamin D per day. It is possible to meet your calcium requirement with diet alone, but a vitamin D supplement is usually necessary to meet this goal.  When exposed to the sun, use a sunscreen that protects against both UVA and UVB radiation with an SPF of 30 or greater. Reapply every 2 to 3 hours or after sweating, drying off with a towel, or swimming. Always wear a seat belt when traveling in a car. Always wear a helmet when riding a bicycle or motorcycle.

## 2023-07-08 DIAGNOSIS — Z76.0 MEDICATION REFILL: ICD-10-CM

## 2023-07-10 NOTE — TELEPHONE ENCOUNTER
Liliana Miramontes is calling to request a refill on the following medication(s):    Medication Request:  Requested Prescriptions     Pending Prescriptions Disp Refills    rivaroxaban (XARELTO) 20 MG TABS tablet [Pharmacy Med Name: XARELTO 20MG TABLETS] 90 tablet 2     Sig: TAKE 1 TABLET BY MOUTH EVERY DAY       Last Visit Date (If Applicable):  65/85/5615    Next Visit Date:    12/21/2023

## 2023-09-01 ENCOUNTER — TELEPHONE (OUTPATIENT)
Dept: FAMILY MEDICINE CLINIC | Age: 78
End: 2023-09-01

## 2023-09-18 NOTE — TELEPHONE ENCOUNTER
Patient daughter Consuelo Schwartz called in on patient behalf regarding update on handicap placard? Last communication consent was filed in 2017 and no information was released she state patient has dementia and is upset because at his appointment if this was asked to be updated she wouldve been added and will like for something be said so this can be updated at upcoming appointment.          [phone]

## 2023-10-06 DIAGNOSIS — I10 PRIMARY HYPERTENSION: ICD-10-CM

## 2023-10-06 RX ORDER — LISINOPRIL 10 MG/1
10 TABLET ORAL DAILY
Qty: 90 TABLET | Refills: 1 | Status: SHIPPED | OUTPATIENT
Start: 2023-10-06

## 2023-10-06 NOTE — TELEPHONE ENCOUNTER
Julianne Villalobos is calling to request a refill on the following medication(s):    Medication Request:  Requested Prescriptions     Pending Prescriptions Disp Refills    lisinopril (PRINIVIL;ZESTRIL) 10 MG tablet [Pharmacy Med Name: LISINOPRIL 10MG TABLETS] 90 tablet 1     Sig: TAKE 1 TABLET BY MOUTH DAILY       Last Visit Date (If Applicable):  04/52/2471    Next Visit Date:    12/21/2023

## 2023-11-22 ENCOUNTER — TELEPHONE (OUTPATIENT)
Dept: PHARMACY | Facility: CLINIC | Age: 78
End: 2023-11-22

## 2023-11-22 NOTE — TELEPHONE ENCOUNTER
Provider [address]   2023 10:00 AM NO Lawton CNP [facility], [facility], [address] [facility], toll free: [phone], option 1    For Pharmacy Admin Tracking Only    Program: Clark in place:  No  Recommendation Provided To: Pharmacy: 2  Intervention Detail: Adherence Monitorin  Intervention Accepted By: Pharmacy: 2  Gap Closed?: No   Time Spent (min): 15

## 2023-12-21 PROBLEM — G31.84 MILD COGNITIVE IMPAIRMENT: Status: ACTIVE | Noted: 2023-12-21

## 2023-12-27 LAB
ALBUMIN SERPL-MCNC: 4.4 G/DL
ALP BLD-CCNC: 61 U/L
ALT SERPL-CCNC: 16 U/L
ANION GAP SERPL CALCULATED.3IONS-SCNC: 11 MMOL/L
AST SERPL-CCNC: 24 U/L
BASOPHILS ABSOLUTE: 0.09 /ΜL
BASOPHILS RELATIVE PERCENT: 1.3 %
BILIRUB SERPL-MCNC: 1.6 MG/DL (ref 0.1–1.4)
BUN BLDV-MCNC: 15 MG/DL
CALCIUM SERPL-MCNC: 9.9 MG/DL
CHLORIDE BLD-SCNC: 107 MMOL/L
CHOLESTEROL, FASTING: 140
CO2: 26 MMOL/L
CREAT SERPL-MCNC: 1.11 MG/DL
EGFR: 69
EOSINOPHILS ABSOLUTE: 0.16 /ΜL
EOSINOPHILS RELATIVE PERCENT: 2.4 %
GLUCOSE BLD-MCNC: 103 MG/DL
HCT VFR BLD CALC: 48.4 % (ref 41–53)
HDLC SERPL-MCNC: 31 MG/DL (ref 35–70)
HEMOGLOBIN: 16.2 G/DL (ref 13.5–17.5)
LDL CHOLESTEROL CALCULATED: 75 MG/DL (ref 0–160)
LYMPHOCYTES ABSOLUTE: 2.85 /ΜL
LYMPHOCYTES RELATIVE PERCENT: 42.7 %
MCH RBC QN AUTO: 28.8 PG
MCHC RBC AUTO-ENTMCNC: 33.5 G/DL
MCV RBC AUTO: 86 FL
MONOCYTES ABSOLUTE: 0.79 /ΜL
MONOCYTES RELATIVE PERCENT: 11.8 %
NEUTROPHILS ABSOLUTE: 2.77 /ΜL
NEUTROPHILS RELATIVE PERCENT: 41.5 %
PDW BLD-RTO: 13.3 %
PLATELET # BLD: 175 K/ΜL
PMV BLD AUTO: 12.6 FL
POTASSIUM SERPL-SCNC: 4.6 MMOL/L
PROSTATE SPECIFIC ANTIGEN: 0.06 NG/ML
RBC # BLD: 5.63 10^6/ΜL
SODIUM BLD-SCNC: 144 MMOL/L
TOTAL PROTEIN: 6.8
TRIGLYCERIDE, FASTING: 171
TSH SERPL DL<=0.05 MIU/L-ACNC: 1.17 UIU/ML
WBC # BLD: 6.7 10^3/ML

## 2023-12-28 DIAGNOSIS — Z12.5 SCREENING PSA (PROSTATE SPECIFIC ANTIGEN): ICD-10-CM

## 2023-12-28 DIAGNOSIS — Z00.00 MEDICARE ANNUAL WELLNESS VISIT, SUBSEQUENT: ICD-10-CM

## 2024-01-30 RX ORDER — ROSUVASTATIN CALCIUM 5 MG/1
TABLET, COATED ORAL
Qty: 90 TABLET | Refills: 3 | Status: SHIPPED | OUTPATIENT
Start: 2024-01-30

## 2024-01-30 NOTE — TELEPHONE ENCOUNTER
Nolberto Morgan is calling to request a refill on the following medication(s):    Medication Request:  Requested Prescriptions     Pending Prescriptions Disp Refills    rosuvastatin (CRESTOR) 5 MG tablet [Pharmacy Med Name: ROSUVASTATIN 5MG TABLETS] 90 tablet 3     Sig: TAKE 1 TABLET BY MOUTH DAILY       Last Visit Date (If Applicable):  12/21/2023    Next Visit Date:    6/25/2024

## 2024-03-06 ENCOUNTER — TELEPHONE (OUTPATIENT)
Dept: FAMILY MEDICINE CLINIC | Age: 79
End: 2024-03-06

## 2024-03-06 NOTE — TELEPHONE ENCOUNTER
SUBJECT: Patient called stating that he fell while running this morning and he hit hit head. He has a black eye and pain in his right breast. I advised patient to go to ED because he is on Xarelto and he hit his head. Patient did not want to go at first but I did explain why wanted him to go and our concern with him being on a blood thinner and him hitting his head. He stated he would go to the ED.     PATIENT/CALLER:Patient    CURRENT SYMPTOMS: Black eye, pain in right breast  ONSET: this morning

## 2024-06-10 DIAGNOSIS — I10 PRIMARY HYPERTENSION: ICD-10-CM

## 2024-06-10 RX ORDER — LISINOPRIL 10 MG/1
10 TABLET ORAL DAILY
Qty: 90 TABLET | Refills: 1 | Status: SHIPPED | OUTPATIENT
Start: 2024-06-10

## 2024-06-10 NOTE — TELEPHONE ENCOUNTER
Nolberto Morgan is calling to request a refill on the following medication(s):    Last Visit Date (If Applicable):  12/21/2023    Next Visit Date:    6/25/2024    Medication Request:  Requested Prescriptions     Pending Prescriptions Disp Refills    lisinopril (PRINIVIL;ZESTRIL) 10 MG tablet 90 tablet 1     Sig: Take 1 tablet by mouth daily

## 2024-06-25 ENCOUNTER — OFFICE VISIT (OUTPATIENT)
Dept: FAMILY MEDICINE CLINIC | Age: 79
End: 2024-06-25
Payer: MEDICARE

## 2024-06-25 VITALS
OXYGEN SATURATION: 94 % | WEIGHT: 203.2 LBS | BODY MASS INDEX: 33.81 KG/M2 | TEMPERATURE: 97.3 F | HEART RATE: 58 BPM | SYSTOLIC BLOOD PRESSURE: 118 MMHG | DIASTOLIC BLOOD PRESSURE: 84 MMHG

## 2024-06-25 DIAGNOSIS — I10 PRIMARY HYPERTENSION: Primary | ICD-10-CM
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
## 2024-06-25 DIAGNOSIS — E78.2 MIXED HYPERLIPIDEMIA: ICD-10-CM

## 2024-06-25 DIAGNOSIS — G31.84 MILD COGNITIVE IMPAIRMENT: ICD-10-CM

## 2024-06-25 DIAGNOSIS — Z74.09 IMPAIRED MOBILITY: ICD-10-CM

## 2024-06-25 PROCEDURE — 1123F ACP DISCUSS/DSCN MKR DOCD: CPT | Performed by: NURSE PRACTITIONER

## 2024-06-25 PROCEDURE — 99214 OFFICE O/P EST MOD 30 MIN: CPT | Performed by: NURSE PRACTITIONER

## 2024-06-25 PROCEDURE — 3079F DIAST BP 80-89 MM HG: CPT | Performed by: NURSE PRACTITIONER

## 2024-06-25 PROCEDURE — 3074F SYST BP LT 130 MM HG: CPT | Performed by: NURSE PRACTITIONER

## 2024-06-25 RX ORDER — MEMANTINE HYDROCHLORIDE 10 MG/1
TABLET ORAL
Qty: 60 TABLET | Refills: 2 | Status: SHIPPED | OUTPATIENT
Start: 2024-06-25

## 2024-06-25 ASSESSMENT — PATIENT HEALTH QUESTIONNAIRE - PHQ9
2. FEELING DOWN, DEPRESSED OR HOPELESS: NOT AT ALL
1. LITTLE INTEREST OR PLEASURE IN DOING THINGS: NOT AT ALL
SUM OF ALL RESPONSES TO PHQ QUESTIONS 1-9: 0
SUM OF ALL RESPONSES TO PHQ9 QUESTIONS 1 & 2: 0
SUM OF ALL RESPONSES TO PHQ QUESTIONS 1-9: 0

## 2024-06-25 NOTE — PROGRESS NOTES
and exercise.  7. Discussed use, benefit, and side effects of prescribed medications.  Barriers to medication compliance addressed.  All her questions were answered.  Pt voiced understanding. Nolberto will continue current medications, diet and exercise.      Of the  30  minute duration appointment visit, Lora Plascencia CNP spent at least 50% of the face-to-face time in counseling, explanation of diagnosis, planning of further management, and answering all questions.          Signed:  Lora Plascencia CNP

## 2024-07-01 LAB
ALBUMIN: 4.3 G/DL
ALP BLD-CCNC: 60 U/L
ALT SERPL-CCNC: 18 U/L
ANION GAP SERPL CALCULATED.3IONS-SCNC: 10 MMOL/L
AST SERPL-CCNC: 24 U/L
BASOPHILS ABSOLUTE: 0.1 /ΜL
BASOPHILS RELATIVE PERCENT: 0.6 %
BILIRUB SERPL-MCNC: 1.8 MG/DL (ref 0.1–1.4)
BUN BLDV-MCNC: 17 MG/DL
CALCIUM SERPL-MCNC: 10.2 MG/DL
CHLORIDE BLD-SCNC: 108 MMOL/L
CHOLESTEROL, FASTING: 123
CO2: 26 MMOL/L
CREAT SERPL-MCNC: 1.2 MG/DL
EOSINOPHILS ABSOLUTE: 0.2 /ΜL
EOSINOPHILS RELATIVE PERCENT: 2.3 %
GFR, ESTIMATED: 63
GLUCOSE BLD-MCNC: 90 MG/DL
HCT VFR BLD CALC: 48.3 % (ref 41–53)
HDLC SERPL-MCNC: 31 MG/DL (ref 35–70)
HEMOGLOBIN: 16.2 G/DL (ref 13.5–17.5)
LDL CHOLESTEROL: 58
LYMPHOCYTES ABSOLUTE: 3.6 /ΜL
LYMPHOCYTES RELATIVE PERCENT: 41.3 %
MCH RBC QN AUTO: 29.4 PG
MCHC RBC AUTO-ENTMCNC: 33.5 G/DL
MCV RBC AUTO: 88 FL
MONOCYTES ABSOLUTE: 0.9 /ΜL
MONOCYTES RELATIVE PERCENT: 9.8 %
NEUTROPHILS ABSOLUTE: 4 /ΜL
NEUTROPHILS RELATIVE PERCENT: 46 %
PDW BLD-RTO: 14 %
PLATELET # BLD: 182 K/ΜL
PMV BLD AUTO: 11.1 FL
POTASSIUM SERPL-SCNC: 4 MMOL/L
RBC # BLD: 5.52 10^6/ΜL
SODIUM BLD-SCNC: 144 MMOL/L
TOTAL PROTEIN: 7.4 G/DL (ref 6.4–8.2)
TRIGLYCERIDE, FASTING: 171
TSH SERPL DL<=0.05 MIU/L-ACNC: 1.25 UIU/ML
WBC # BLD: 8.8 10^3/ML

## 2024-07-10 DIAGNOSIS — I10 PRIMARY HYPERTENSION: ICD-10-CM

## 2024-07-10 DIAGNOSIS — E78.2 MIXED HYPERLIPIDEMIA: ICD-10-CM

## 2024-07-22 DIAGNOSIS — Z76.0 MEDICATION REFILL: ICD-10-CM

## 2024-07-22 NOTE — TELEPHONE ENCOUNTER
Nolberto Morgan is calling to request a refill on the following medication(s):    Medication Request:  Requested Prescriptions     Pending Prescriptions Disp Refills    rivaroxaban (XARELTO) 20 MG TABS tablet [Pharmacy Med Name: XARELTO 20MG TABLETS] 90 tablet 2     Sig: TAKE 1 TABLET BY MOUTH EVERY DAY       Last Visit Date (If Applicable):  6/25/2024    Next Visit Date:    12/23/2024

## 2024-07-29 ENCOUNTER — TELEPHONE (OUTPATIENT)
Dept: FAMILY MEDICINE CLINIC | Age: 79
End: 2024-07-29

## 2024-07-29 DIAGNOSIS — G31.84 MILD COGNITIVE IMPAIRMENT: Primary | ICD-10-CM

## 2024-07-29 NOTE — TELEPHONE ENCOUNTER
Advised pt.'s Daughter Adela she understood also faxed ref to her at [phone] per her request.

## 2024-07-29 NOTE — TELEPHONE ENCOUNTER
SUBJECT: driving  PATIENT/CALLER: ex wife  CURRENT SYMPTOMS: Driving is getting worse. Got lost the other day,angry, almost got into an altercation at [employer], threatening suicide.  WHAT HAS BEEN TRIED: Neuro appt isn't until Dec

## 2024-10-10 NOTE — TELEPHONE ENCOUNTER
Problem: Discharge Planning  Goal: Discharge to home or other facility with appropriate resources  Outcome: Progressing  Flowsheets (Taken 10/9/2024 2215 by Caitlin Mckeon, RN)  Discharge to home or other facility with appropriate resources: Refer to discharge planning if patient needs post-hospital services based on physician order or complex needs related to functional status, cognitive ability or social support system     Problem: Safety - Adult  Goal: Free from fall injury  Outcome: Progressing     Problem: Chronic Conditions and Co-morbidities  Goal: Patient's chronic conditions and co-morbidity symptoms are monitored and maintained or improved  Outcome: Progressing  Flowsheets (Taken 10/9/2024 2215 by Caitlin Mckeon, RN)  Care Plan - Patient's Chronic Conditions and Co-Morbidity Symptoms are Monitored and Maintained or Improved: Monitor and assess patient's chronic conditions and comorbid symptoms for stability, deterioration, or improvement     Problem: Pain  Goal: Verbalizes/displays adequate comfort level or baseline comfort level  Outcome: Progressing     Problem: Respiratory - Adult  Goal: Achieves optimal ventilation and oxygenation  Outcome: Progressing     Problem: Gastrointestinal - Adult  Goal: Minimal or absence of nausea and vomiting  Outcome: Progressing  Goal: Maintains or returns to baseline bowel function  Outcome: Progressing  Goal: Maintains adequate nutritional intake  Outcome: Progressing  Flowsheets (Taken 10/9/2024 2215 by Caitlin Mckeon, RN)  Maintains adequate nutritional intake: Monitor percentage of each meal consumed  Goal: Establish and maintain optimal ostomy function  Outcome: Progressing     Problem: Metabolic/Fluid and Electrolytes - Adult  Goal: Electrolytes maintained within normal limits  Outcome: Progressing  Goal: Hemodynamic stability and optimal renal function maintained  Outcome: Progressing  Goal: Glucose maintained within prescribed range  Outcome:  I placed a referral for drive training. Please call and give ex wife the information.

## 2024-10-20 DIAGNOSIS — I10 PRIMARY HYPERTENSION: ICD-10-CM

## 2024-10-21 RX ORDER — LISINOPRIL 10 MG/1
10 TABLET ORAL DAILY
Qty: 90 TABLET | Refills: 1 | Status: SHIPPED | OUTPATIENT
Start: 2024-10-21

## 2024-10-21 NOTE — TELEPHONE ENCOUNTER
Nolberto Morgan is calling to request a refill on the following medication(s):    Medication Request:  Requested Prescriptions     Pending Prescriptions Disp Refills    lisinopril (PRINIVIL;ZESTRIL) 10 MG tablet [Pharmacy Med Name: LISINOPRIL 10MG TABLETS] 90 tablet 1     Sig: TAKE 1 TABLET BY MOUTH DAILY       Last Visit Date (If Applicable):  6/25/2024    Next Visit Date:    12/4/2024

## 2024-11-13 ENCOUNTER — OFFICE VISIT (OUTPATIENT)
Dept: NEUROLOGY | Age: 79
End: 2024-11-13
Payer: MEDICARE

## 2024-11-13 VITALS
WEIGHT: 205.2 LBS | HEIGHT: 64 IN | BODY MASS INDEX: 35.03 KG/M2 | SYSTOLIC BLOOD PRESSURE: 161 MMHG | HEART RATE: 36 BPM | DIASTOLIC BLOOD PRESSURE: 76 MMHG

## 2024-11-13 DIAGNOSIS — F09 COGNITIVE DISORDER: Primary | ICD-10-CM

## 2024-11-13 DIAGNOSIS — E03.8 OTHER SPECIFIED HYPOTHYROIDISM: ICD-10-CM

## 2024-11-13 DIAGNOSIS — G47.10 HYPERSOMNIA, UNSPECIFIED: ICD-10-CM

## 2024-11-13 DIAGNOSIS — G47.33 OBSTRUCTIVE SLEEP APNEA SYNDROME: ICD-10-CM

## 2024-11-13 PROCEDURE — 3078F DIAST BP <80 MM HG: CPT | Performed by: PSYCHIATRY & NEUROLOGY

## 2024-11-13 PROCEDURE — 1123F ACP DISCUSS/DSCN MKR DOCD: CPT | Performed by: PSYCHIATRY & NEUROLOGY

## 2024-11-13 PROCEDURE — 3077F SYST BP >= 140 MM HG: CPT | Performed by: PSYCHIATRY & NEUROLOGY

## 2024-11-13 PROCEDURE — 1159F MED LIST DOCD IN RCRD: CPT | Performed by: PSYCHIATRY & NEUROLOGY

## 2024-11-13 PROCEDURE — 99204 OFFICE O/P NEW MOD 45 MIN: CPT | Performed by: PSYCHIATRY & NEUROLOGY

## 2024-11-13 ASSESSMENT — ENCOUNTER SYMPTOMS
EYES NEGATIVE: 1
RESPIRATORY NEGATIVE: 1
ALLERGIC/IMMUNOLOGIC NEGATIVE: 1
GASTROINTESTINAL NEGATIVE: 1

## 2024-11-13 NOTE — PROGRESS NOTES
Resource Strain: Low Risk  (12/21/2023)    Overall Financial Resource Strain (CARDIA)     Difficulty of Paying Living Expenses: Not hard at all   Food Insecurity: No Food Insecurity (11/1/2024)    Received from [facility]    Hunger Screening     Within the past 12 months we worried whether our food would run out before we got money to buy more.: Never True     Within the past 12 months the food we bought just didn't last and we didn't have money to get more.: Never True   Transportation Needs: Unknown (12/21/2023)    PRAPARE - Transportation     Lack of Transportation (Non-Medical): No   Physical Activity: Insufficiently Active (12/21/2023)    Exercise Vital Sign     Days of Exercise per Week: 2 days     Minutes of Exercise per Session: 10 min   Housing Stability: Unknown (12/21/2023)    Housing Stability Vital Sign     Unstable Housing in the Last Year: No       Current Outpatient Medications   Medication Sig Dispense Refill    lisinopril (PRINIVIL;ZESTRIL) 10 MG tablet TAKE 1 TABLET BY MOUTH DAILY 90 tablet 1    rivaroxaban (XARELTO) 20 MG TABS tablet TAKE 1 TABLET BY MOUTH EVERY DAY 90 tablet 2    memantine (NAMENDA) 10 MG tablet TAKE 1 TABLET BY MOUTH TWICE DAILY Orally BID for 90 days 60 tablet 2    rosuvastatin (CRESTOR) 5 MG tablet TAKE 1 TABLET BY MOUTH DAILY 90 tablet 3    sildenafil (REVATIO) 20 MG tablet sildenafil (pulmonary hypertension) 20 mg tablet   Take 1 tablet 3 times a day by oral route.      acetaminophen (TYLENOL) 325 MG tablet Take 2 tablets by mouth every 6 hours as needed for Pain (Patient not taking: Reported on 11/13/2024)       No current facility-administered medications for this visit.       No Known Allergies      Review of Systems     Vitals:    11/13/24 0854   BP: (!) 161/76   Pulse: (!) 36     Supine 168/90 , sitting 167/97 , standing 145/89     Review of Systems   Constitutional: Negative.    HENT: Negative.     Eyes: Negative.    Respiratory: Negative.

## 2024-12-04 ENCOUNTER — OFFICE VISIT (OUTPATIENT)
Dept: FAMILY MEDICINE CLINIC | Age: 79
End: 2024-12-04

## 2024-12-04 VITALS
HEART RATE: 87 BPM | SYSTOLIC BLOOD PRESSURE: 118 MMHG | BODY MASS INDEX: 35.02 KG/M2 | TEMPERATURE: 97.5 F | OXYGEN SATURATION: 95 % | DIASTOLIC BLOOD PRESSURE: 80 MMHG | WEIGHT: 204 LBS

## 2024-12-04 DIAGNOSIS — I10 PRIMARY HYPERTENSION: ICD-10-CM

## 2024-12-04 DIAGNOSIS — Z00.00 MEDICARE ANNUAL WELLNESS VISIT, SUBSEQUENT: Primary | ICD-10-CM

## 2024-12-04 ASSESSMENT — PATIENT HEALTH QUESTIONNAIRE - PHQ9
SUM OF ALL RESPONSES TO PHQ9 QUESTIONS 1 & 2: 0
SUM OF ALL RESPONSES TO PHQ QUESTIONS 1-9: 0
SUM OF ALL RESPONSES TO PHQ QUESTIONS 1-9: 0
1. LITTLE INTEREST OR PLEASURE IN DOING THINGS: NOT AT ALL
SUM OF ALL RESPONSES TO PHQ QUESTIONS 1-9: 0
2. FEELING DOWN, DEPRESSED OR HOPELESS: NOT AT ALL
SUM OF ALL RESPONSES TO PHQ QUESTIONS 1-9: 0

## 2024-12-04 ASSESSMENT — LIFESTYLE VARIABLES
HOW MANY STANDARD DRINKS CONTAINING ALCOHOL DO YOU HAVE ON A TYPICAL DAY: 1 OR 2
HOW OFTEN DO YOU HAVE A DRINK CONTAINING ALCOHOL: MONTHLY OR LESS

## 2024-12-04 NOTE — PROGRESS NOTES
Medicare Annual Wellness Visit    Nolberto Morgan is here for Medicare AWV    Assessment & Plan  1. Medicare annual wellness visit.  His last blood work was conducted in July 2023. An eye exam was recommended as part of his annual wellness visit. He was advised to ensure that loose rugs and carpets are secured to prevent falls. Blood work was ordered, and he was instructed to fast for 8 to 10 hours prior to the test. He will be contacted with the results of the blood work.    2. Dementia.  He is currently on the highest dose of his dementia medication. His condition is progressively getting worse. He had a recent visit with a new neurologist who has ordered several tests, including an MRI of his head, a spinal tap (pending insurance approval), and a stress test. The neurologist is working to determine the next steps in his treatment plan.    Follow-up  Return in summer for follow-up, or sooner if necessary.    Medicare annual wellness visit, subsequent  Primary hypertension  -     CBC with Auto Differential; Future  -     Comprehensive Metabolic Panel; Future  -     TSH with Reflex; Future  -     Lipid, Fasting; Future    Results      Recommendations for Preventive Services Due: see orders and patient instructions/AVS.  Recommended screening schedule for the next 5-10 years is provided to the patient in written form: see Patient Instructions/AVS.     Return in about 6 months (around 6/4/2025) for chronic conditions.     Subjective   History of Present Illness  The patient is a 79-year-old male here for Medicare annual wellness visit. He is accompanied by an adult female.    He has automatic refills for most of his medications. He is not interested in receiving any more COVID-19 vaccines. He is currently under the care of a neurologist, who has recommended several tests including a driving test, MRI of the head, spinal tap, and stress test. The neurologist is also considering increasing his dementia medication dosage,

## 2024-12-04 NOTE — PATIENT INSTRUCTIONS
help you develop a safe and effective exercise program.  A counselor or psychiatrist can help you cope with issues such as depression, anxiety, or family problems that can make it hard to focus on weight loss.  Consider joining a support group for people who are trying to lose weight. Your doctor can suggest groups in your area.  Where can you learn more?  Go to https://www.[employer].net/patientEd and enter U357 to learn more about \"Starting a Weight Loss Plan: Care Instructions.\"  Current as of: September 20, 2023  Content Version: 14.2  © 2024 [employer].   Care instructions adapted under license by [employer]. If you have questions about a medical condition or this instruction, always ask your healthcare professional. [employer], [employer] disclaims any warranty or liability for your use of this information.           A Healthy Heart: Care Instructions  Overview     Coronary artery disease, also called heart disease, occurs when a substance called plaque builds up in the vessels that supply oxygen-rich blood to your heart muscle. This can narrow the blood vessels and reduce blood flow. A heart attack happens when blood flow is completely blocked. A high-fat diet, smoking, and other factors increase the risk of heart disease.  Your doctor has found that you have a chance of having heart disease. A heart-healthy lifestyle can help keep your heart healthy and prevent heart disease. This lifestyle includes eating healthy, being active, staying at a weight that's healthy for you, and not smoking or using tobacco. It also includes taking medicines as directed, managing other health conditions, and trying to get a healthy amount of sleep.  Follow-up care is a key part of your treatment and safety. Be sure to make and go to all appointments, and call your doctor if you are having problems. It's also a good idea to know your test results and keep a list of the medicines you take.  How can you care for

## 2024-12-16 ENCOUNTER — HOSPITAL ENCOUNTER (OUTPATIENT)
Age: 79
Setting detail: SPECIMEN
Discharge: HOME | End: 2024-12-16

## 2024-12-16 DIAGNOSIS — E03.8 OTHER SPECIFIED HYPOTHYROIDISM: ICD-10-CM

## 2024-12-16 DIAGNOSIS — I10 PRIMARY HYPERTENSION: ICD-10-CM

## 2024-12-16 DIAGNOSIS — F09 COGNITIVE DISORDER: ICD-10-CM

## 2024-12-16 LAB
ALBUMIN SERPL-MCNC: 4.4 G/DL (ref 3.5–5.2)
ALBUMIN/GLOB SERPL: 1.6 {RATIO} (ref 1–2.5)
ALP SERPL-CCNC: 73 U/L (ref 40–129)
ALT SERPL-CCNC: 19 U/L (ref 10–50)
ANION GAP SERPL CALCULATED.3IONS-SCNC: 9 MMOL/L (ref 9–16)
AST SERPL-CCNC: 29 U/L (ref 10–50)
BASOPHILS # BLD: 0.08 K/UL (ref 0–0.2)
BASOPHILS NFR BLD: 1 % (ref 0–2)
BILIRUB SERPL-MCNC: 1.1 MG/DL (ref 0–1.2)
BUN SERPL-MCNC: 15 MG/DL (ref 8–23)
CALCIUM SERPL-MCNC: 10.3 MG/DL (ref 8.6–10.4)
CHLORIDE SERPL-SCNC: 108 MMOL/L (ref 98–107)
CHOLEST SERPL-MCNC: 130 MG/DL (ref 0–199)
CHOLESTEROL/HDL RATIO: 3.9
CO2 SERPL-SCNC: 27 MMOL/L (ref 20–31)
CREAT SERPL-MCNC: 1.3 MG/DL (ref 0.7–1.2)
EOSINOPHIL # BLD: 0.16 K/UL (ref 0–0.44)
EOSINOPHILS RELATIVE PERCENT: 2 % (ref 1–4)
ERYTHROCYTE [DISTWIDTH] IN BLOOD BY AUTOMATED COUNT: 14.8 % (ref 11.8–14.4)
ERYTHROCYTE [SEDIMENTATION RATE] IN BLOOD BY PHOTOMETRIC METHOD: 2 MM/HR (ref 0–20)
FOLATE SERPL-MCNC: 9.1 NG/ML (ref 4.8–24.2)
GFR, ESTIMATED: 57 ML/MIN/1.73M2
GLUCOSE SERPL-MCNC: 101 MG/DL (ref 74–99)
HCT VFR BLD AUTO: 52.1 % (ref 40.7–50.3)
HDLC SERPL-MCNC: 33 MG/DL
HGB BLD-MCNC: 16.4 G/DL (ref 13–17)
IMM GRANULOCYTES # BLD AUTO: 0.08 K/UL (ref 0–0.3)
IMM GRANULOCYTES NFR BLD: 1 %
LDLC SERPL CALC-MCNC: 72 MG/DL (ref 0–100)
LYMPHOCYTES NFR BLD: 2.57 K/UL (ref 1.1–3.7)
LYMPHOCYTES RELATIVE PERCENT: 36 % (ref 24–43)
MCH RBC QN AUTO: 28.2 PG (ref 25.2–33.5)
MCHC RBC AUTO-ENTMCNC: 31.5 G/DL (ref 28.4–34.8)
MCV RBC AUTO: 89.7 FL (ref 82.6–102.9)
MONOCYTES NFR BLD: 0.86 K/UL (ref 0.1–1.2)
MONOCYTES NFR BLD: 12 % (ref 3–12)
NEUTROPHILS NFR BLD: 48 % (ref 36–65)
NEUTS SEG NFR BLD: 3.43 K/UL (ref 1.5–8.1)
NRBC BLD-RTO: 0 PER 100 WBC
PLATELET # BLD AUTO: 194 K/UL (ref 138–453)
PMV BLD AUTO: 12.7 FL (ref 8.1–13.5)
POTASSIUM SERPL-SCNC: 5.1 MMOL/L (ref 3.7–5.3)
PROT SERPL-MCNC: 7.1 G/DL (ref 6.6–8.7)
RBC # BLD AUTO: 5.81 M/UL (ref 4.21–5.77)
RBC # BLD: ABNORMAL 10*6/UL
SODIUM SERPL-SCNC: 144 MMOL/L (ref 136–145)
T PALLIDUM AB SER QL IA: NONREACTIVE
TRIGL SERPL-MCNC: 123 MG/DL
TSH SERPL DL<=0.05 MIU/L-ACNC: 1.34 UIU/ML (ref 0.27–4.2)
TSH SERPL DL<=0.05 MIU/L-ACNC: 1.37 UIU/ML (ref 0.27–4.2)
VIT B12 SERPL-MCNC: 332 PG/ML (ref 232–1245)
VLDLC SERPL CALC-MCNC: 25 MG/DL (ref 1–30)
WBC OTHER # BLD: 7.2 K/UL (ref 3.5–11.3)

## 2024-12-17 DIAGNOSIS — E78.2 MIXED HYPERLIPIDEMIA: Primary | ICD-10-CM

## 2024-12-17 LAB
ANA SER QL IA: NEGATIVE
DSDNA IGG SER QL IA: 1.8 IU/ML
NUCLEAR IGG SER IA-RTO: 0.4 U/ML

## 2024-12-17 RX ORDER — ROSUVASTATIN CALCIUM 10 MG/1
10 TABLET, COATED ORAL NIGHTLY
Qty: 90 TABLET | Refills: 1 | Status: SHIPPED | OUTPATIENT
Start: 2024-12-17

## 2024-12-26 ENCOUNTER — TELEPHONE (OUTPATIENT)
Dept: PHARMACY | Facility: CLINIC | Age: 79
End: 2024-12-26

## 2024-12-26 NOTE — TELEPHONE ENCOUNTER
[facility] CLINICAL PHARMACY REVIEW: ADHERENCE  Identified care gap per [employer]: fills at [facility] : ACE/ARB adherence    Patient also appears to be prescribed: Statin (passed)    ASSESSMENT  ACE/ARB ADHERENCE    Insurance Records claims through 12/26/24 (Prior Year PDC = 68% - FAILED; YTD PDC = 80%; Potential Fail Date: 12/29/2024):   lisinopril 10mg daily last filled on 08/01/2024 for 90 day supply. Next refill due: 10/30/2024    Last Rx sent to [facility] on 10/21/2024 for 90ds with 1 refill    BP Readings from Last 3 Encounters:   12/04/24 118/80   11/13/24 (!) 161/76   06/25/24 118/84     Lab Results   Component Value Date/Time    LABGLOM 57 12/16/2024 11:50 AM    LABGLOM 69 12/27/2023 12:00 AM     Lab Results   Component Value Date    CREATININE 1.3 (H) 12/16/2024     Estimated Creatinine Clearance: 49 mL/min (A) (based on SCr of 1.3 mg/dL (H)).    Lab Results   Component Value Date    K 5.1 12/16/2024      PLAN  The following are interventions that have been identified:   Patient OVERDUE refilling lisinopril and active on home medication list    Per [employer] portal, patient last filled lisinopril at [facility]. Per chart review, last lisinopril prescription was to [facility]. Additionally, rosuvastatin was last filled at [facility] so appears patient has changed preferred pharmacies.     Outreach to pharmacy - Left message requesting refill be processed.    Will send letter to patient regarding the above.      Future Appointments   Date Time Provider Department Center   6/4/2025 12:00 PM Seal, Lora, APRN - CNP [facility] DEP     Lita iWtt, PharmD, BCACP, BCGP  Population [facility] Pharmacist   [facility] Clinical Pharmacy  Department, toll free: [phone], option 1    =======================================================    For Pharmacy Admin Tracking Only  Program: [facility] [employer]  CPA in place:  No  Recommendation Provided To: Pharmacy: 1  Intervention Detail: Adherence Monitoring:

## 2025-01-03 ENCOUNTER — TELEPHONE (OUTPATIENT)
Dept: NEUROLOGY | Age: 80
End: 2025-01-03

## 2025-01-03 ENCOUNTER — TELEPHONE (OUTPATIENT)
Dept: FAMILY MEDICINE CLINIC | Age: 80
End: 2025-01-03

## 2025-01-03 DIAGNOSIS — G89.29 CHRONIC BILATERAL LOW BACK PAIN WITHOUT SCIATICA: Primary | ICD-10-CM

## 2025-01-03 DIAGNOSIS — M54.50 CHRONIC BILATERAL LOW BACK PAIN WITHOUT SCIATICA: Primary | ICD-10-CM

## 2025-01-03 RX ORDER — LIDOCAINE 50 MG/G
1 PATCH TOPICAL DAILY
Qty: 30 PATCH | Refills: 0 | Status: SHIPPED | OUTPATIENT
Start: 2025-01-03 | End: 2025-02-02

## 2025-01-03 NOTE — TELEPHONE ENCOUNTER
Patient daughter called and stated the patient back has been in a lot of pain lately and he's gotten lidocaine patches in the past and they worked well for him , she's requesting [facility] supply [facility] [place].

## 2025-01-03 NOTE — TELEPHONE ENCOUNTER
Adela (daughter on SIL) called the office asking if the driving evaluation order could be sent to [facility] as they also do driving evaluations there. Order and progress note faxed to the number provided by Adela.    Faxed documents scanned and attached to this encounter.

## 2025-01-08 ENCOUNTER — TELEPHONE (OUTPATIENT)
Dept: PHARMACY | Facility: CLINIC | Age: 80
End: 2025-01-08

## 2025-01-08 NOTE — TELEPHONE ENCOUNTER
Outreach to [facility]'s pharmacy to inquire if pt picked up LISINOPRIL TAB 10MG. Pharmacy stated that the RX was never picked up and was put back on the shelf.     Patience Orlando MA  [facility] Clinical   [facility] Clinical Pharmacy  [phone] Option 1     For Pharmacy Admin Tracking Only    Program: [employer]  CPA in place:  No  Gap Closed?: No   Time Spent (min): 15

## 2025-01-14 ENCOUNTER — TELEPHONE (OUTPATIENT)
Dept: NEUROLOGY | Age: 80
End: 2025-01-14

## 2025-01-14 NOTE — TELEPHONE ENCOUNTER
Patient's daughter Adela Lincoln (HIPAA) left a message asking that the MRI order be faxed to [facility] radiology.  Writer placed a call back to Adela and asked if she wanted the EEG order faxed as well.  Adela verbally agreed, asking for the EEG order to be faxed to [facility] as well.  Daughter stated that her father declined having the sleep study done.  Call placed to [facility] radiology and fax confirmed ([phone]), and neurodiagnostic dept fax ([phone]).  Both orders were faxed.

## 2025-02-05 DIAGNOSIS — E78.2 MIXED HYPERLIPIDEMIA: ICD-10-CM

## 2025-02-21 DIAGNOSIS — F09 COGNITIVE DISORDER: ICD-10-CM

## 2025-02-28 ENCOUNTER — TELEPHONE (OUTPATIENT)
Dept: NEUROLOGY | Age: 80
End: 2025-02-28

## 2025-02-28 NOTE — TELEPHONE ENCOUNTER
02/28/25  RECEIVED SURGERY CLEARANCE FORM FROM [facility].  SURGERY SCHEDULED 3/24/25 W/SANTO COBOS M.D.

## 2025-03-04 ENCOUNTER — TELEPHONE (OUTPATIENT)
Dept: NEUROLOGY | Age: 80
End: 2025-03-04

## 2025-03-04 NOTE — TELEPHONE ENCOUNTER
Patient's daughter, Adela, called into the office requesting the results of his MRI. Please advise.

## 2025-03-04 NOTE — TELEPHONE ENCOUNTER
Call placed back to vandana Daja (HIPAA) and this information was given.  Daughter verbally stated her understanding.

## 2025-03-05 ENCOUNTER — TELEPHONE (OUTPATIENT)
Dept: NEUROLOGY | Age: 80
End: 2025-03-05

## 2025-06-17 ENCOUNTER — TELEPHONE (OUTPATIENT)
Dept: FAMILY MEDICINE CLINIC | Age: 80
End: 2025-06-17

## 2025-06-17 DIAGNOSIS — I10 PRIMARY HYPERTENSION: ICD-10-CM

## 2025-06-17 NOTE — TELEPHONE ENCOUNTER
[facility] CLINICAL PHARMACY: ADHERENCE REVIEW  Identified care gap per [employer]: fills at [facility] : ACE/ARB adherence    ASSESSMENT    ACE/ARB ADHERENCE    Insurance Records claims through 2025 (Prior Year PDC = 81% - PASSED ; YTD PDC = FIRST FILL; Potential Fail Date: 2025):   LISINOPRIL TAB 10MG  last filled on 2025 for 90 day supply. Next refill due: 2025    Prescribed si tablet/capsule daily    Per Insurer Portal: last filled on 2025 for 90 day supply.     Per [facility]: last picked up on 2025 for 90 day supply. 0 refills remaining.  Will send refill request to prescriber.    BP Readings from Last 3 Encounters:   24 118/80   24 (!) 161/76   24 118/84     CrCl cannot be calculated (Patient's most recent lab result is older than the maximum 180 days allowed.).  Lab Results   Component Value Date    CREATININE 1.3 (H) 2024     Lab Results   Component Value Date    K 5.1 2024       PLAN  Per insurer report, LIS-0 - co-pays are based on tiers and patient is subject to coverage gap.      The following are interventions that have been identified:   Patient OVERDUE refilling LISINOPRIL TAB 10MG and active on home medication list.   Patient NEEDS REFILLS for LISINOPRIL TAB 10MG    Reached patient to review. Patient will  refills      Last Visit: 2024  Next Visit: None    Patience Orlando MA  [facility] Clinical   [facility] Clinical Pharmacy  [phone] Option 1

## 2025-06-18 RX ORDER — LISINOPRIL 10 MG/1
10 TABLET ORAL DAILY
Qty: 90 TABLET | Refills: 0 | OUTPATIENT
Start: 2025-06-18

## 2025-06-18 NOTE — TELEPHONE ENCOUNTER
Pharmacy generated refill request to prescriber for LISINOPRIL TAB 10MG. Refill was Refused By: Talia Thomas MA Reason for Refusal: Patient needs an appointment. Pts last appointment was 12/04/2024. Routing to pharmacist to reach out to provider.    Patience Orlando MA  [facility] Clinical   [facility] Clinical Pharmacy  [phone] Option 1

## 2025-06-18 NOTE — TELEPHONE ENCOUNTER
Lora Plascencia, NO - CNP, patient out of refills of lisinopril. Rx pended for your signature/modification as appropriate. Eligible for 100 day supply for $0 copay through his insurance if OK with you    Last Visit: 12/4/24  Next Visit: to be scheduled - please have staff outreach to assist with scheduling appointment if needed for refill Rx    Thank you,  Brissa Giles, PharmD, [facility] Hospital[facility]  Population Health Pharmacist  [facility] Clinical Pharmacy  Department, toll free: [phone], option 1

## 2025-06-19 RX ORDER — LISINOPRIL 10 MG/1
10 TABLET ORAL DAILY
Qty: 100 TABLET | Refills: 1 | OUTPATIENT
Start: 2025-06-19

## 2025-06-23 NOTE — TELEPHONE ENCOUNTER
Outreach to pt on 06/20 to his home phone, no answer. Outreach to pt on 06/23 to \"his daughters phone\" which is the same number as the pts home phone number, someone picked up and hung up without saying anything. Unable to contact patient to inform that he will need to schedule an appointment before his LISINOPRIL TAB 10MG can be refilled.     Patience Orlando MA  [facility] Clinical   [facility] Clinical Pharmacy  [phone] Option 1

## 2025-06-23 NOTE — TELEPHONE ENCOUNTER
Letter sent notifying pt he needs appointment with Lora Plascencia APRN - CNP for lisinopril refill.    For Pharmacy Admin Tracking Only    Program: [employer]  CPA in place:  No  Recommendation Provided To: Provider: 1 via Note to Provider and Pharmacy: 1  Intervention Detail: Adherence Monitorin and Refill(s) Provided  Intervention Accepted By: Provider: 0 and Pharmacy: 1  Gap Closed?: No   Time Spent (min): 30

## 2025-08-27 ENCOUNTER — CARE COORDINATION (OUTPATIENT)
Dept: CARE COORDINATION | Age: 80
End: 2025-08-27

## 2025-08-28 ENCOUNTER — CARE COORDINATION (OUTPATIENT)
Dept: CARE COORDINATION | Age: 80
End: 2025-08-28

## 2025-08-28 ENCOUNTER — CARE COORDINATION (OUTPATIENT)
Dept: CARE COORDINATION | Facility: CLINIC | Age: 80
End: 2025-08-28

## 2025-09-04 ENCOUNTER — CARE COORDINATION (OUTPATIENT)
Dept: CARE COORDINATION | Age: 80
End: 2025-09-04

## 2025-09-04 SDOH — ECONOMIC STABILITY: INCOME INSECURITY: IN THE LAST 12 MONTHS, WAS THERE A TIME WHEN YOU WERE NOT ABLE TO PAY THE MORTGAGE OR RENT ON TIME?: NO

## 2025-09-04 ASSESSMENT — SOCIAL DETERMINANTS OF HEALTH (SDOH)
DO YOU BELONG TO ANY CLUBS OR ORGANIZATIONS SUCH AS CHURCH GROUPS UNIONS, FRATERNAL OR ATHLETIC GROUPS, OR SCHOOL GROUPS?: NO
HOW OFTEN DO YOU ATTENT MEETINGS OF THE CLUB OR ORGANIZATION YOU BELONG TO?: NEVER
IN A TYPICAL WEEK, HOW MANY TIMES DO YOU TALK ON THE PHONE WITH FAMILY, FRIENDS, OR NEIGHBORS?: MORE THAN THREE TIMES A WEEK
HOW OFTEN DO YOU GET TOGETHER WITH FRIENDS OR RELATIVES?: MORE THAN THREE TIMES A WEEK
HOW OFTEN DO YOU ATTEND CHURCH OR RELIGIOUS SERVICES?: NEVER

## (undated) DEVICE — BLADE CLIPPER GEN PURP NS

## (undated) DEVICE — GARMENT,MEDLINE,DVT,INT,CALF,MED, GEN2: Brand: MEDLINE

## (undated) DEVICE — SKIN PREP TRAY W/CHG: Brand: MEDLINE INDUSTRIES, INC.

## (undated) DEVICE — GLOVE SURG SZ 75 L12IN FNGR THK87MIL WHT LTX FREE

## (undated) DEVICE — 3M™ WARMING BLANKET, UPPER BODY, 10 PER CASE, 42268: Brand: BAIR HUGGER™

## (undated) DEVICE — HERNIA PK

## (undated) DEVICE — SKIN AFFIX SURG ADHESIVE 72/CS 0.55ML: Brand: MEDLINE

## (undated) DEVICE — TUBING, SUCTION, 1/4" X 12', STRAIGHT: Brand: MEDLINE

## (undated) DEVICE — TOWEL,OR,DSP,ST,BLUE,STD,4/PK,20PK/CS: Brand: MEDLINE

## (undated) DEVICE — CLIPVAC PRESURG HAIR REMOVAL

## (undated) DEVICE — YANKAUER,FLEXIBLE HANDLE,REGLR CAPACITY: Brand: MEDLINE INDUSTRIES, INC.

## (undated) DEVICE — GLOVE SURG SZ 8 L12IN FNGR THK87MIL WHT LTX FREE

## (undated) DEVICE — Z DISCONTINUED USE 2624853 GLOVE SURG SZ 75 L12IN THK91MIL BRN LTX FREE

## (undated) DEVICE — GOWN,SIRUS,NON REINFRCD,LARGE,SET IN SL: Brand: MEDLINE